# Patient Record
Sex: MALE | Race: WHITE | URBAN - METROPOLITAN AREA
[De-identification: names, ages, dates, MRNs, and addresses within clinical notes are randomized per-mention and may not be internally consistent; named-entity substitution may affect disease eponyms.]

---

## 2017-01-25 DIAGNOSIS — M17.0 PRIMARY OSTEOARTHRITIS OF BOTH KNEES: Primary | ICD-10-CM

## 2017-01-25 RX ORDER — TRAMADOL HYDROCHLORIDE 50 MG/1
50 TABLET ORAL EVERY 6 HOURS PRN
Qty: 10 TABLET | Refills: 0 | Status: SHIPPED | OUTPATIENT
Start: 2017-01-25 | End: 2017-02-17

## 2017-01-25 NOTE — TELEPHONE ENCOUNTER
traMADol (ULTRAM) 50 MG tablet      Last Written Prescription Date:  12-15-16  Last Fill Quantity: 10,   # refills: 0  Last Office Visit with [facility], [facility] or [facility] prescribing provider: 11-9-16  Future Office visit:       Routing refill request to provider for review/approval because:  Drug not on the [facility], [facility] or [facility] refill protocol or controlled substance

## 2017-02-02 DIAGNOSIS — M17.0 PRIMARY OSTEOARTHRITIS OF BOTH KNEES: Primary | ICD-10-CM

## 2017-02-02 NOTE — TELEPHONE ENCOUNTER
traMADol (ULTRAM) 50 MG tablet      Last Written Prescription Date:  1/25/17  Last Fill Quantity: 10,   # refills: 0  Last Office Visit with [facility], [facility] or [facility] prescribing provider: 11/9/16  Future Office visit:       Routing refill request to provider for review/approval because:  Drug not on the [facility], [facility] or [facility] refill protocol or controlled substance

## 2017-02-03 RX ORDER — TRAMADOL HYDROCHLORIDE 50 MG/1
50 TABLET ORAL EVERY 6 HOURS PRN
Qty: 10 TABLET | Refills: 0 | OUTPATIENT
Start: 2017-02-03

## 2017-02-17 DIAGNOSIS — M75.41 IMPINGEMENT SYNDROME OF RIGHT SHOULDER: ICD-10-CM

## 2017-02-17 DIAGNOSIS — M17.0 PRIMARY OSTEOARTHRITIS OF BOTH KNEES: ICD-10-CM

## 2017-02-17 DIAGNOSIS — J31.0 CHRONIC RHINITIS: ICD-10-CM

## 2017-02-17 RX ORDER — FLUTICASONE PROPIONATE 50 MCG
2 SPRAY, SUSPENSION (ML) NASAL DAILY
Qty: 1 BOTTLE | Refills: 5 | Status: SHIPPED | OUTPATIENT
Start: 2017-02-17 | End: 2017-08-19

## 2017-02-17 RX ORDER — NAPROXEN 500 MG/1
500 TABLET ORAL 2 TIMES DAILY PRN
Qty: 60 TABLET | Refills: 2 | Status: SHIPPED | OUTPATIENT
Start: 2017-02-17 | End: 2017-05-18

## 2017-02-17 NOTE — TELEPHONE ENCOUNTER
traMADol (ULTRAM) 50 MG tablet      Last Written Prescription Date:  1-25-17  Last Fill Quantity: 10,   # refills: 0  Last Office Visit with [facility], [facility] or [facility] prescribing provider: 11-9-16  Future Office visit:       Routing refill request to provider for review/approval because:  Drug not on the [facility], [facility] or [facility] refill protocol or controlled substance

## 2017-02-21 RX ORDER — TRAMADOL HYDROCHLORIDE 50 MG/1
50 TABLET ORAL EVERY 6 HOURS PRN
Qty: 10 TABLET | Refills: 0 | Status: SHIPPED | OUTPATIENT
Start: 2017-02-21 | End: 2017-11-21

## 2017-03-17 ENCOUNTER — MYC MEDICAL ADVICE (OUTPATIENT)
Dept: FAMILY MEDICINE | Facility: CLINIC | Age: 67
End: 2017-03-17

## 2017-03-17 DIAGNOSIS — J06.9 UPPER RESPIRATORY TRACT INFECTION, UNSPECIFIED TYPE: Primary | ICD-10-CM

## 2017-03-17 RX ORDER — ALBUTEROL SULFATE 90 UG/1
2 AEROSOL, METERED RESPIRATORY (INHALATION) EVERY 6 HOURS PRN
Qty: 1 INHALER | Refills: 1 | Status: SHIPPED | OUTPATIENT
Start: 2017-03-17 | End: 2017-05-18

## 2017-03-17 NOTE — TELEPHONE ENCOUNTER
Inhaler       Routing refill request to provider for review/approval because:  Medication is reported/historical        Sherri Astorga RN [facility] Triage.

## 2017-03-23 DIAGNOSIS — M17.0 PRIMARY OSTEOARTHRITIS OF BOTH KNEES: ICD-10-CM

## 2017-03-23 NOTE — TELEPHONE ENCOUNTER
traMADol (ULTRAM) 50 MG tablet      Last Written Prescription Date:  2/21/17  Last Fill Quantity: 10,   # refills: 0  Last Office Visit with [facility], [facility] or [facility] prescribing provider: 11/9/16  Future Office visit:       Routing refill request to provider for review/approval because:  Drug not on the [facility], [facility] or [facility] refill protocol or controlled substance

## 2017-03-24 RX ORDER — TRAMADOL HYDROCHLORIDE 50 MG/1
50 TABLET ORAL EVERY 6 HOURS PRN
Qty: 10 TABLET | Refills: 0 | OUTPATIENT
Start: 2017-03-24

## 2017-04-06 DIAGNOSIS — M17.0 PRIMARY OSTEOARTHRITIS OF BOTH KNEES: ICD-10-CM

## 2017-04-06 RX ORDER — TRAMADOL HYDROCHLORIDE 50 MG/1
50 TABLET ORAL EVERY 6 HOURS PRN
Qty: 10 TABLET | Refills: 0 | OUTPATIENT
Start: 2017-04-06

## 2017-04-06 NOTE — TELEPHONE ENCOUNTER
traMADol (ULTRAM) 50 MG tablet      Last Written Prescription Date:  2-21-17  Last Fill Quantity: 10,   # refills: 0  Last Office Visit with [facility], [facility] or [facility] prescribing provider: 11-9-16  Future Office visit:       Routing refill request to provider for review/approval because:  Drug not on the [facility], [facility] or [facility] refill protocol or controlled substance

## 2017-04-14 ENCOUNTER — TELEPHONE (OUTPATIENT)
Dept: FAMILY MEDICINE | Facility: CLINIC | Age: 67
End: 2017-04-14

## 2017-04-14 DIAGNOSIS — M17.0 PRIMARY OSTEOARTHRITIS OF BOTH KNEES: ICD-10-CM

## 2017-04-14 RX ORDER — TRAMADOL HYDROCHLORIDE 50 MG/1
50 TABLET ORAL EVERY 6 HOURS PRN
Qty: 10 TABLET | Refills: 0 | OUTPATIENT
Start: 2017-04-14

## 2017-04-14 NOTE — TELEPHONE ENCOUNTER
Faxed request    traMADol (ULTRAM) 50 MG tablet      Last Written Prescription Date:  2-21-17  Last Fill Quantity: 10,   # refills: 0  Last Office Visit with [facility], [facility] or [facility] prescribing provider: 11-9-16  Future Office visit:       Routing refill request to provider for review/approval because:  Drug not on the [facility], [facility] or [facility] refill protocol or controlled substance

## 2017-04-18 RX ORDER — TRAMADOL HYDROCHLORIDE 50 MG/1
50 TABLET ORAL EVERY 6 HOURS PRN
Qty: 10 TABLET | Refills: 0 | OUTPATIENT
Start: 2017-04-18

## 2017-04-18 NOTE — TELEPHONE ENCOUNTER
Called pharmacy, patient has been requesting this.  Did inform them that we are denying this Rx.

## 2017-04-18 NOTE — TELEPHONE ENCOUNTER
Yes, requested and denied. Same thing again. I don't want him on narcotics for this. You may need to call the pharmacy and mention this so they don't keep requesting the med from us.

## 2017-04-18 NOTE — TELEPHONE ENCOUNTER
Called and spoke with patient, advised of denial.  RN offered to connect him with Ortho again or send message to PCP to see what he advises.  He will go through his Ortho people to see what they would recommend for his pain.    Tracy Laurent RN  [facility]

## 2017-04-18 NOTE — TELEPHONE ENCOUNTER
Looks like this has been requested a couple of times?  (see encounters 3/23 & 4/6).    Routed to PCP.    Tracy Laurent RN  [facility]

## 2017-04-24 DIAGNOSIS — N40.1 BENIGN NON-NODULAR PROSTATIC HYPERPLASIA WITH LOWER URINARY TRACT SYMPTOMS: ICD-10-CM

## 2017-04-24 NOTE — TELEPHONE ENCOUNTER
tamsulosin (FLOMAX) 0.4 MG 24 hr capsule         Last Written Prescription Date: 10-3-16  Last Fill Quantity: 30, # refills: 5    Last Office Visit with [facility], [facility] or [facility] prescribing provider:  11-9-16   Future Office Visit:      BP Readings from Last 3 Encounters:   11/09/16 136/73   11/03/16 138/75   06/15/16 (!) 164/97

## 2017-04-25 RX ORDER — TAMSULOSIN HYDROCHLORIDE 0.4 MG/1
0.4 CAPSULE ORAL DAILY
Qty: 30 CAPSULE | Refills: 5 | Status: SHIPPED | OUTPATIENT
Start: 2017-04-25 | End: 2017-09-09

## 2017-05-18 DIAGNOSIS — M75.41 IMPINGEMENT SYNDROME OF RIGHT SHOULDER: ICD-10-CM

## 2017-05-18 DIAGNOSIS — J06.9 UPPER RESPIRATORY TRACT INFECTION, UNSPECIFIED TYPE: ICD-10-CM

## 2017-05-19 RX ORDER — ALBUTEROL SULFATE 90 UG/1
2 AEROSOL, METERED RESPIRATORY (INHALATION) EVERY 6 HOURS PRN
Qty: 18 G | Refills: 0 | Status: SHIPPED | OUTPATIENT
Start: 2017-05-19 | End: 2017-06-15

## 2017-05-19 RX ORDER — NAPROXEN 500 MG/1
TABLET ORAL
Qty: 60 TABLET | Refills: 1 | Status: SHIPPED | OUTPATIENT
Start: 2017-05-19 | End: 2017-11-21

## 2017-05-19 NOTE — TELEPHONE ENCOUNTER
Routing refill request to provider for review/approval because:  Labs not current:  AST  ALT (2012)  Albuterol needs to be signed.     Tawny Mares RN  [facility]

## 2017-05-19 NOTE — TELEPHONE ENCOUNTER
albuterol (PROAIR HFA/PROVENTIL HFA/VENTOLIN HFA) 108 (90 BASE) MCG/ACT Inhaler       Last Written Prescription Date: 3/17/17  Last Fill Quantity: 1, # refills: 1    Last Office Visit with [facility], [facility] or [facility] prescribing provider:  11/9/16   Future Office Visit:       Date of Last Asthma Action Plan Letter:   There are no preventive care reminders to display for this patient.   Asthma Control Test: No flowsheet data found.    Date of Last Spirometry Test:   No results found for this or any previous visit.        naproxen (NAPROSYN) 500 MG tablet      Last Written Prescription Date: 2/17/17  Last Quantity: 60, # refills: 2  Last Office Visit with [facility], [facility] or [facility] prescribing provider: 11/9/16       Creatinine   Date Value Ref Range Status   11/03/2016 1.03 0.66 - 1.25 mg/dL Final     Lab Results   Component Value Date    AST 41 03/03/2012     Lab Results   Component Value Date    ALT 32 03/03/2012     BP Readings from Last 3 Encounters:   11/09/16 136/73   11/03/16 138/75   06/15/16 (!) 164/97

## 2017-05-25 ENCOUNTER — TELEPHONE (OUTPATIENT)
Dept: FAMILY MEDICINE | Facility: CLINIC | Age: 67
End: 2017-05-25

## 2017-05-25 NOTE — TELEPHONE ENCOUNTER
Forms received. They were for Edwige's significant other. Closing encounter-forms being addressed in correct patient chart.

## 2017-05-25 NOTE — TELEPHONE ENCOUNTER
Reason for Call:  Other call back    Detailed comments:   Patient has a form that he needs DBL to complete today for unemployment/county benefits.  He will be faxing form over to clinic now.  Please fax forms to him when done.  Please address ASAP.    Phone Number Patient can be reached at: Home number on file [phone] (home)    Best Time: any    Can we leave a detailed message on this number? YES    Call taken on 5/25/2017 at 12:43 PM by Susy Zamudio

## 2017-06-08 DIAGNOSIS — M75.41 IMPINGEMENT SYNDROME OF RIGHT SHOULDER: ICD-10-CM

## 2017-06-08 DIAGNOSIS — J06.9 UPPER RESPIRATORY TRACT INFECTION, UNSPECIFIED TYPE: ICD-10-CM

## 2017-06-08 NOTE — TELEPHONE ENCOUNTER
naproxen (NAPROSYN) 500 MG tablet      Last Written Prescription Date: 5/19/17  Last Quantity: 60, # refills: 1  Last Office Visit with [facility], [facility] or [facility] prescribing provider: 11/9/16       Creatinine   Date Value Ref Range Status   11/03/2016 1.03 0.66 - 1.25 mg/dL Final     Lab Results   Component Value Date    AST 41 03/03/2012     Lab Results   Component Value Date    ALT 32 03/03/2012     BP Readings from Last 3 Encounters:   11/09/16 136/73   11/03/16 138/75   06/15/16 (!) 164/97     albuterol (VENTOLIN HFA) 108 (90 BASE) MCG/ACT Inhaler       Last Written Prescription Date: 5/19/17  Last Fill Quantity: 18, # refills: 0    Last Office Visit with [facility], [facility] or [facility] prescribing provider:  11/9/16   Future Office Visit:       Date of Last Asthma Action Plan Letter:   There are no preventive care reminders to display for this patient.   Asthma Control Test: No flowsheet data found.    Date of Last Spirometry Test:   No results found for this or any previous visit.

## 2017-06-09 DIAGNOSIS — M17.0 PRIMARY OSTEOARTHRITIS OF BOTH KNEES: ICD-10-CM

## 2017-06-09 RX ORDER — NAPROXEN 500 MG/1
TABLET ORAL
Qty: 60 TABLET | Refills: 0 | Status: SHIPPED | OUTPATIENT
Start: 2017-06-09 | End: 2017-07-12

## 2017-06-09 RX ORDER — TRAMADOL HYDROCHLORIDE 50 MG/1
50 TABLET ORAL EVERY 6 HOURS PRN
Qty: 10 TABLET | Refills: 0 | OUTPATIENT
Start: 2017-06-09

## 2017-06-09 NOTE — TELEPHONE ENCOUNTER
traMADol (ULTRAM) 50 MG tablet      Last Written Prescription Date:  02/21/2017  Last Fill Quantity: 10,   # refills: 0  [facility] Visit with [facility] or [facility] prescribing provider: 11/09/2016  Future Office visit:       Routing refill request to provider for review/approval because:  Drug not on the [facility], [facility] or [facility] refill protocol or controlled substance

## 2017-06-09 NOTE — TELEPHONE ENCOUNTER
Naproxen   Prescription approved per [facility] Ref[place] Protocol.    Albuterol inhaler was prescribed for URI on 11/9/2016 now requesting a refill? L/M for patient to clarify need for Albuterol inhaler.     Tawny Mares RN  [facility]

## 2017-06-13 NOTE — TELEPHONE ENCOUNTER
Patient will need to be seen before refills of Ventolin inhaler.  Last picked up at pharmacy on 5/23/17   L/M to call nurse line.    Tawny Mares RN  [facility]

## 2017-06-15 DIAGNOSIS — M17.0 PRIMARY OSTEOARTHRITIS OF BOTH KNEES: ICD-10-CM

## 2017-06-15 DIAGNOSIS — J06.9 UPPER RESPIRATORY TRACT INFECTION, UNSPECIFIED TYPE: ICD-10-CM

## 2017-06-15 RX ORDER — ALBUTEROL SULFATE 90 UG/1
2 AEROSOL, METERED RESPIRATORY (INHALATION) EVERY 6 HOURS PRN
Qty: 18 G | Refills: 1 | Status: SHIPPED | OUTPATIENT
Start: 2017-06-15 | End: 2017-09-07

## 2017-06-15 RX ORDER — ALBUTEROL SULFATE 90 UG/1
AEROSOL, METERED RESPIRATORY (INHALATION)
Qty: 18 G | Refills: 0 | OUTPATIENT
Start: 2017-06-15

## 2017-06-15 RX ORDER — TRAMADOL HYDROCHLORIDE 50 MG/1
50 TABLET ORAL EVERY 6 HOURS PRN
Qty: 10 TABLET | Refills: 0 | OUTPATIENT
Start: 2017-06-15

## 2017-06-15 NOTE — TELEPHONE ENCOUNTER
traMADol (ULTRAM) 50 MG tablet      Last Written Prescription Date:  2-21-17  Last Fill Quantity: 10,   # refills: 0  Last [facility] Visit with [facility] or [facility] prescribing provider: 11-9-16  Future Office visit:       Routing refill request to provider for review/approval because:  Drug not on the [facility], [facility] or [facility] refill protocol or controlled substance

## 2017-06-15 NOTE — TELEPHONE ENCOUNTER
albuterol (VENTOLIN HFA) 108 (90 BASE) MCG/ACT Inhaler       Last Written Prescription Date: 5-19-17  Last Fill Quantity: 18g, # refills: 0    Last Office Visit with [facility], [facility] or [facility] prescribing provider:  11-9-16   Future Office Visit:       Date of Last Asthma Action Plan Letter:   There are no preventive care reminders to display for this patient.   Asthma Control Test: No flowsheet data found.    Date of Last Spirometry Test:   No results found for this or any previous visit.

## 2017-06-15 NOTE — TELEPHONE ENCOUNTER
Routing refill request to provider for review/approval because:  Diagnosis is not on problem list  AAP, ACT not completed.    Sherri Astorga RN [facility] Triage.

## 2017-06-21 NOTE — TELEPHONE ENCOUNTER
This has been denied in the past and is denied again. I do not want him on narcotics for his musculoskeletal ailments.

## 2017-06-21 NOTE — TELEPHONE ENCOUNTER
Reason for Call:  Other prescription    Detailed comments: Aisha marley [facility] is calling to follow up on refill request that was sent on the 9th    Phone Number Patient can be reached at: Other phone number:  [phone]    Best Time: asap    Can we leave a detailed message on this number? YES    Call taken on 6/21/2017 at 2:37 PM by Elmo Carolina

## 2017-06-22 NOTE — TELEPHONE ENCOUNTER
TC contacted Aisha at the pharmacy and informed them that refill request is denied. Routing to communicate message to patient.

## 2017-06-22 NOTE — TELEPHONE ENCOUNTER
Patient last seen for routine visit 11/9/16.    I see the previous refusal note:    April 18, 2017   Tracy Laurent, JENNIFER          Called and spoke with patient, advised of denial.  RN offered to connect him with Ortho again or send message to PCP to see what he advises.  He will go through his Ortho people to see what they would recommend for his pain.     Tracy Laurent RN  [facility]           Also, more recently, 6/21/17:    Frandy Page MD       3:12 PM   Note      This has been denied in the past and is denied again. I do not want him on narcotics for his musculoskeletal ailments.              Attempted to call patient at home number, left message on answering service requesting call back to clinic to discuss.  Fern Gallegos RN  [facility]

## 2017-06-26 NOTE — TELEPHONE ENCOUNTER
Attempted to call patient at [phone] (home), no answer.  Left VM to return call to RN Triage line.    Tracy Laurent RN  [facility]

## 2017-06-27 NOTE — TELEPHONE ENCOUNTER
Attempted to call patient at home number, left message on answering service requesting call back to clinic to discuss.  Fern Gallegos RN  [facility]

## 2017-06-27 NOTE — TELEPHONE ENCOUNTER
Huddled with PCP.  If patient does not return call today, will send iTaggithart message communicating the desire to not use narcotics for musculoskeletal pain.  This RN will send message.      Tracy Laurent RN  [facility]

## 2017-06-28 NOTE — TELEPHONE ENCOUNTER
Informed via MyChart of refusal.    Tracy Laurent RN  [facility]

## 2017-07-12 DIAGNOSIS — M75.41 IMPINGEMENT SYNDROME OF RIGHT SHOULDER: ICD-10-CM

## 2017-07-12 RX ORDER — NAPROXEN 500 MG/1
TABLET ORAL
Qty: 60 TABLET | Refills: 0 | Status: SHIPPED | OUTPATIENT
Start: 2017-07-12 | End: 2017-08-15

## 2017-07-12 NOTE — TELEPHONE ENCOUNTER
naproxen (NAPROSYN) 500 MG tablet      Last Written Prescription Date: 6/9/17  Last Quantity: 60, # refills: 0  Last Office Visit with [facility], [facility] or [facility] prescribing provider: 11/9/16       Creatinine   Date Value Ref Range Status   11/03/2016 1.03 0.66 - 1.25 mg/dL Final     Lab Results   Component Value Date    AST 41 03/03/2012     Lab Results   Component Value Date    ALT 32 03/03/2012     BP Readings from Last 3 Encounters:   11/09/16 136/73   11/03/16 138/75   06/15/16 (!) 164/97

## 2017-07-12 NOTE — TELEPHONE ENCOUNTER
Routing refill request to provider for review/approval because:  Labs not current:  Liver function.     Arline Cyr RN

## 2017-08-15 DIAGNOSIS — M75.41 IMPINGEMENT SYNDROME OF RIGHT SHOULDER: ICD-10-CM

## 2017-08-16 RX ORDER — NAPROXEN 500 MG/1
TABLET ORAL
Qty: 60 TABLET | Refills: 1 | Status: SHIPPED | OUTPATIENT
Start: 2017-08-16 | End: 2017-09-09

## 2017-08-16 NOTE — TELEPHONE ENCOUNTER
naproxen (NAPROSYN) 500 MG tablet      Last Written Prescription Date: 7-12-17  Last Quantity: 60, # refills: 0  Last Office Visit with [facility], [facility] or [facility] prescribing provider: 11-9-16       Creatinine   Date Value Ref Range Status   11/03/2016 1.03 0.66 - 1.25 mg/dL Final     Lab Results   Component Value Date    AST 41 03/03/2012     Lab Results   Component Value Date    ALT 32 03/03/2012     BP Readings from Last 3 Encounters:   11/09/16 136/73   11/03/16 138/75   06/15/16 (!) 164/97

## 2017-08-16 NOTE — TELEPHONE ENCOUNTER
Routing refill request to provider for review/approval because:  Labs not current  Sherri Astorga RN [facility] Triage.

## 2017-08-19 DIAGNOSIS — J31.0 CHRONIC RHINITIS: ICD-10-CM

## 2017-08-21 RX ORDER — FLUTICASONE PROPIONATE 50 MCG
SPRAY, SUSPENSION (ML) NASAL
Qty: 16 ML | Refills: 2 | Status: SHIPPED | OUTPATIENT
Start: 2017-08-21 | End: 2017-12-18

## 2017-08-21 NOTE — TELEPHONE ENCOUNTER
fluticasone (FLONASE) 50 MCG/ACT spray       Last Written Prescription Date: 2-17-17  Last Fill Quantity: 1, # refills: 5    Last Office Visit with [facility], [facility] or [facility] prescribing provider:  11-9-16   Future Office Visit:       Date of Last Asthma Action Plan Letter:   There are no preventive care reminders to display for this patient.   Asthma Control Test: No flowsheet data found.    Date of Last Spirometry Test:   No results found for this or any previous visit.

## 2017-08-21 NOTE — TELEPHONE ENCOUNTER
Prescription approved per [facility] Refill Protocol.  Fern Gallegos RN  [facility]

## 2017-09-07 DIAGNOSIS — J06.9 UPPER RESPIRATORY TRACT INFECTION, UNSPECIFIED TYPE: ICD-10-CM

## 2017-09-08 RX ORDER — ALBUTEROL SULFATE 90 UG/1
AEROSOL, METERED RESPIRATORY (INHALATION)
Qty: 18 G | Refills: 0 | Status: SHIPPED | OUTPATIENT
Start: 2017-09-08 | End: 2017-09-27

## 2017-09-08 NOTE — TELEPHONE ENCOUNTER
albuterol (VENTOLIN HFA) 108 (90 BASE) MCG/ACT Inhaler       Last Written Prescription Date: 6/15/17  Last Fill Quantity: 18, # refills: 1    Last Office Visit with [facility], [facility] or [facility] prescribing provider:  11/9/16   Future Office Visit:       Date of Last Asthma Action Plan Letter:   There are no preventive care reminders to display for this patient.   Asthma Control Test: No flowsheet data found.    Date of Last Spirometry Test:   No results found for this or any previous visit.

## 2017-09-08 NOTE — TELEPHONE ENCOUNTER
Routing refill request to provider for review/approval because:  Diagnosis is not on problem list  AAP, ACT not completed.  Anais Rich RN-BSN

## 2017-09-09 DIAGNOSIS — M75.41 IMPINGEMENT SYNDROME OF RIGHT SHOULDER: ICD-10-CM

## 2017-09-09 DIAGNOSIS — N40.1 BENIGN NON-NODULAR PROSTATIC HYPERPLASIA WITH LOWER URINARY TRACT SYMPTOMS: ICD-10-CM

## 2017-09-11 NOTE — TELEPHONE ENCOUNTER
naproxen (NAPROSYN) 500 MG tablet      Last Written Prescription Date: 8/16/17  Last Quantity: 60, # refills: 1  Last Office Visit with [facility], [facility], [facility] or [place] prescribing provider: 8/16/17       Creatinine   Date Value Ref Range Status   11/03/2016 1.03 0.66 - 1.25 mg/dL Final     Lab Results   Component Value Date    AST 41 03/03/2012     Lab Results   Component Value Date    ALT 32 03/03/2012     BP Readings from Last 3 Encounters:   11/09/16 136/73   11/03/16 138/75   06/15/16 (!) 164/97     tamsulosin (FLOMAX) 0.4 MG capsule         Last Written Prescription Date: 4/25/17  Last Fill Quantity: 30, # refills: 5    Last Office Visit with [facility], [facility], [facility] or [place] prescribing provider:  11/9/16   Future Office Visit:      BP Readings from Last 3 Encounters:   11/09/16 136/73   11/03/16 138/75   06/15/16 (!) 164/97

## 2017-09-12 RX ORDER — NAPROXEN 500 MG/1
TABLET ORAL
Qty: 60 TABLET | Refills: 1 | Status: SHIPPED | OUTPATIENT
Start: 2017-09-12 | End: 2017-11-21

## 2017-09-12 RX ORDER — TAMSULOSIN HYDROCHLORIDE 0.4 MG/1
CAPSULE ORAL
Qty: 30 CAPSULE | Refills: 1 | Status: SHIPPED | OUTPATIENT
Start: 2017-09-12 | End: 2017-12-21

## 2017-09-27 DIAGNOSIS — J06.9 UPPER RESPIRATORY TRACT INFECTION, UNSPECIFIED TYPE: ICD-10-CM

## 2017-09-27 NOTE — TELEPHONE ENCOUNTER
VENTOLIN  (90 BASE) MCG/ACT Inhaler       Last Written Prescription Date: 9/8/17  Last Fill Quantity: 18, # refills: 0    Last Office Visit with [facility], [facility] or [facility] prescribing provider:  11/9/16   Future Office Visit:       Date of Last Asthma Action Plan Letter:   There are no preventive care reminders to display for this patient.   Asthma Control Test: No flowsheet data found.    Date of Last Spirometry Test:   No results found for this or any previous visit.

## 2017-09-29 RX ORDER — ALBUTEROL SULFATE 90 UG/1
AEROSOL, METERED RESPIRATORY (INHALATION)
Qty: 18 G | Refills: 2 | Status: ON HOLD | OUTPATIENT
Start: 2017-09-29 | End: 2018-01-04

## 2017-09-29 NOTE — TELEPHONE ENCOUNTER
Prescription approved per [facility] Refill Protocol.      Tracy Laurent RN  [facility]

## 2017-10-05 ENCOUNTER — TRANSFERRED RECORDS (OUTPATIENT)
Dept: HEALTH INFORMATION MANAGEMENT | Facility: CLINIC | Age: 67
End: 2017-10-05

## 2017-10-17 ENCOUNTER — TELEPHONE (OUTPATIENT)
Dept: FAMILY MEDICINE | Facility: CLINIC | Age: 67
End: 2017-10-17

## 2017-10-17 NOTE — LETTER
My Depression Action Plan  Name: Edwige Ochoa   Date of Birth 1950  Date: 10/24/2017    My doctor: Frandy Page   My clinic: [address] [id number] [phone]          GREEN    ZONE   Good Control    What it looks like:     Things are going generally well. You have normal up s and down s. You may even feel depressed from time to time, but bad moods usually last less than a day.   What you need to do:  1. Continue to care for yourself (see self care plan)  2. Check your depression survival kit and update it as needed  3. Follow your physician s recommendations including any medication.  4. Do not stop taking medication unless you consult with your physician first.           YELLOW         ZONE Getting Worse    What it looks like:     Depression is starting to interfere with your life.     It may be hard to get out of bed; you may be starting to isolate yourself from others.    Symptoms of depression are starting to last most all day and this has happened for several days.     You may have suicidal thoughts but they are not constant.   What you need to do:     1. Call your care team, your response to treatment will improve if you keep your care team informed of your progress. Yellow periods are signs an adjustment may need to be made.     2. Continue your self-care, even if you have to fake it!    3. Talk to someone in your support network    4. Open up your depression survival kit           RED    ZONE Medical Alert - Get Help    What it looks like:     Depression is seriously interfering with your life.     You may experience these or other symptoms: You can t get out of bed most days, can t work or engage in other necessary activities, you have trouble taking care of basic hygiene, or basic responsibilities, thoughts of suicide or death that will not go away, self-injurious behavior.     What you need to  do:  1. Call your care team and request a same-day appointment. If they are not available (weekends or after hours) call your local crisis line, emergency room or 911.      Electronically signed by: Frandy Page, October 24, 2017    Depression Self Care Plan / Survival Kit    Self-Care for Depression  Here s the deal. Your body and mind are really not as separate as most people think.  What you do and think affects how you feel and how you feel influences what you do and think. This means if you do things that people who feel good do, it will help you feel better.  Sometimes this is all it takes.  There is also a place for medication and therapy depending on how severe your depression is, so be sure to consult with your medical provider and/ or Behavioral Health Consultant if your symptoms are worsening or not improving.     In order to better manage my stress, I will:    Exercise  Get some form of exercise, every day. This will help reduce pain and release endorphins, the  feel good  chemicals in your brain. This is almost as good as taking antidepressants!  This is not the same as joining a gym and then never going! (they count on that by the way ) It can be as simple as just going for a walk or doing some gardening, anything that will get you moving.      Hygiene   Maintain good hygiene (Get out of bed in the morning, Make your bed, Brush your teeth, Take a shower, and Get dressed like you were going to work, even if you are unemployed).  If your clothes don't fit try to get ones that do.    Diet  I will strive to eat foods that are good for me, drink plenty of water, and avoid excessive sugar, caffeine, alcohol, and other mood-altering substances.  Some foods that are helpful in depression are: complex carbohydrates, B vitamins, flaxseed, fish or fish oil, fresh fruits and vegetables.    Psychotherapy  I agree to participate in Individual Therapy (if recommended).    Medication  If prescribed medications, I  agree to take them.  Missing doses can result in serious side effects.  I understand that drinking alcohol, or other illicit drug use, may cause potential side effects.  I will not stop my medication abruptly without first discussing it with my provider.    Staying Connected With Others  I will stay in touch with my friends, family members, and my primary care provider/team.    Use your imagination  Be creative.  We all have a creative side; it doesn t matter if it s oil painting, sand castles, or mud pies! This will also kick up the endorphins.    Witness Beauty  (AKA stop and smell the roses) Take a look outside, even in mid-winter. Notice colors, textures. Watch the squirrels and birds.     Service to others  Be of service to others.  There is always someone else in need.  By helping others we can  get out of ourselves  and remember the really important things.  This also provides opportunities for practicing all the other parts of the program.    Humor  Laugh and be silly!  Adjust your TV habits for less news and crime-drama and more comedy.    Control your stress  Try breathing deep, massage therapy, biofeedback, and meditation. Find time to relax each day.     My support system    Clinic Contact:  Phone number:    Contact 1:  Phone number:    Contact 2:  Phone number:    [employer]/:  Phone number:    Therapist:  Phone number:    Local crisis center:    Phone number:    Other community support:  Phone number:

## 2017-10-17 NOTE — LETTER
October 17, 2017    Edwige Ochoa  [address] [facility] [address]    Dear Edwige    We care about your health and have reviewed your health plan. We have reviewed your medical conditions, medication list, and lab results and are making recommendations based on this review, to better manage your health.    You are in particular need of attention regarding:  - Your Depression  - Scheduling a Colon Cancer Screening (Colonoscopy only) [phone]      Here is a list of Health Maintenance topics that are due now or due soon:  Health Maintenance Due   Topic Date Due     AORTIC ANEURYSM SCREENING (SYSTEM ASSIGNED)  09/20/2015     PHQ-9 Q6 MONTHS  05/03/2017     INFLUENZA VACCINE (SYSTEM ASSIGNED)  09/01/2017     COLON CANCER SCREEN (SYSTEM ASSIGNED)  09/24/2017     DEPRESSION ACTION PLAN Q1 YR  11/03/2017     FALL RISK ASSESSMENT  11/09/2017     We will be calling you in the next couple of weeks to help you schedule any appointments that are needed.  Please call us at [phone] (or use [employer]) to address the above recommendations.     Thank you for trusting [facility] and we appreciate the opportunity to serve you.  We look forward to supporting your healthcare needs in the future.    Healthy Regards,    Dr. Page/rosa

## 2017-10-17 NOTE — LETTER
Enclosed with this letter is a questionnaire about depression for your upcoming visit or contact, and your care team may not see this information before then. We care about you and want to make sure you get the best care possible. If at any time you feel unsafe or have concerns about the safety of others please take immediate action by calling [phone], for mental health crisis phone support 24 hours a day, 365 days per year. As always, you can also go to your local ER, or call 911 if you have immediate safety concerns.

## 2017-10-17 NOTE — TELEPHONE ENCOUNTER
Panel Management Review      Patient has the following on his problem list:     Depression / Dysthymia review    Measure:  Needs PHQ-9 score of 4 or less during index window.  Administer PHQ-9 and if score is 5 or more, send encounter to provider for next steps.    5 - 7 month window range:     PHQ-9 SCORE 7/15/2015 6/2/2016 11/3/2016   Total Score 7 - -   Total Score - 4 3       If PHQ-9 recheck is 5 or more, route to provider for next steps.    Patient is due for:  PHQ9 and DAP        Composite cancer screening  Chart review shows that this patient is due/due soon for the following Colonoscopy  Summary:    Patient is due/failing the following:   COLONOSCOPY, DAP and PHQ9    Action needed:   Patient needs to do PHQ9. and Due for a colonoscopy and DAP     Type of outreach:    Sent letter. and phq-9 with SASE    Questions for provider review:    None                                                                                                                                    Rowan Fung ma       Chart routed to Care Team .

## 2017-10-24 NOTE — TELEPHONE ENCOUNTER
Spoke with patient and he is going to think about a colonoscopy, but will send back the phq-9 ,Please complete a DAP and route chart back to me.

## 2017-11-20 DIAGNOSIS — M75.41 IMPINGEMENT SYNDROME OF RIGHT SHOULDER: ICD-10-CM

## 2017-11-21 RX ORDER — NAPROXEN 500 MG/1
TABLET ORAL
Qty: 60 TABLET | Refills: 0 | Status: SHIPPED | OUTPATIENT
Start: 2017-11-21 | End: 2017-12-18

## 2017-11-21 NOTE — TELEPHONE ENCOUNTER
Requested Prescriptions   Pending Prescriptions Disp Refills     naproxen (NAPROSYN) 500 MG tablet [Pharmacy Med Name: NAPROXEN 500MG TABLETS] 60 tablet 0     Sig: TAKE ONE TABLET BY MOUTH TWICE DAILY AS NEEDED FOR MODERATE PAIN    NSAID Medications Failed    11/20/2017 11:26 PM       Failed - Blood pressure under 140/90    BP Readings from Last 3 Encounters:   11/09/16 136/73   11/03/16 138/75   06/15/16 (!) 164/97                Failed - Normal ALT on file in past 12 months    Recent Labs   Lab Test  03/03/12   1305   ALT  32            Failed - Normal AST on file in past 12 months    Recent Labs   Lab Test  03/03/12   1305   AST  41            Failed - Recent or future visit with authorizing provider's specialty    Patient had office visit in the last year or has a visit in the next 30 days with authorizing provider.  See chart review.              Failed - Patient is age 6-64 years       Failed - Normal CBC on file in past 12 months    Recent Labs   Lab Test  11/03/16   1416   WBC  4.8   RBC  4.24*   HGB  14.1   HCT  39.0*   PLT  262            Failed - Normal serum creatinine on file in past 12 months    Recent Labs   Lab Test  11/03/16   1416   CR  1.03             Routing refill request to provider for review/approval because:  Labs out of range:  Kale Mares RN  [facility]

## 2017-11-25 DIAGNOSIS — N40.1 BENIGN NON-NODULAR PROSTATIC HYPERPLASIA WITH LOWER URINARY TRACT SYMPTOMS: ICD-10-CM

## 2017-11-27 RX ORDER — TAMSULOSIN HYDROCHLORIDE 0.4 MG/1
CAPSULE ORAL
Qty: 30 CAPSULE | Refills: 0 | Status: SHIPPED | OUTPATIENT
Start: 2017-11-27 | End: 2017-12-25

## 2017-11-27 NOTE — TELEPHONE ENCOUNTER
Requested Prescriptions   Pending Prescriptions Disp Refills     tamsulosin (FLOMAX) 0.4 MG capsule [Pharmacy Med Name: TAMSULOSIN 0.4MG CAPSULES] 30 capsule 0     Sig: TAKE 1 CAPSULE(0.4 MG) BY MOUTH DAILY    Alpha Blockers Failed    11/25/2017  9:19 AM       Failed - BP is less than 140/90    BP Readings from Last 3 Encounters:   11/09/16 136/73   11/03/16 138/75   06/15/16 (!) 164/97                Failed - Recent or future visit with authorizing provider's specialty    Patient had office visit in the last year or has a visit in the next 30 days with authorizing provider.  See chart review.              Failed - Patient does not have Tadalafil, Vardenafil, or Sildenafil on their medication list       Passed - Patient is 18 years of age or older      Routing refill request to provider for review/approval because:  Patient needs to be seen because it has been more than 1 year since last office visit. Please advise. Anais Rich RN-BSN

## 2017-11-29 ENCOUNTER — TRANSFERRED RECORDS (OUTPATIENT)
Dept: HEALTH INFORMATION MANAGEMENT | Facility: CLINIC | Age: 67
End: 2017-11-29

## 2017-12-08 ENCOUNTER — TRANSFERRED RECORDS (OUTPATIENT)
Dept: HEALTH INFORMATION MANAGEMENT | Facility: CLINIC | Age: 67
End: 2017-12-08

## 2017-12-15 ENCOUNTER — TRANSFERRED RECORDS (OUTPATIENT)
Dept: HEALTH INFORMATION MANAGEMENT | Facility: CLINIC | Age: 67
End: 2017-12-15

## 2017-12-16 ENCOUNTER — TRANSFERRED RECORDS (OUTPATIENT)
Dept: HEALTH INFORMATION MANAGEMENT | Facility: CLINIC | Age: 67
End: 2017-12-16

## 2017-12-16 LAB
CREAT SERPL-MCNC: 0.87 MG/DL (ref 0.73–1.18)
GFR SERPL CREATININE-BSD FRML MDRD: >60 ML/MIN/1.73M2
GLUCOSE SERPL-MCNC: 110 MG/DL (ref 70–100)
POTASSIUM SERPL-SCNC: 4.5 MMOL/L (ref 3.5–5.2)
TSH SERPL-ACNC: 2 UIU/ML (ref 0.3–4.5)

## 2017-12-18 DIAGNOSIS — J31.0 CHRONIC RHINITIS, UNSPECIFIED TYPE: Primary | ICD-10-CM

## 2017-12-18 DIAGNOSIS — M75.41 IMPINGEMENT SYNDROME OF RIGHT SHOULDER: ICD-10-CM

## 2017-12-21 ENCOUNTER — OFFICE VISIT (OUTPATIENT)
Dept: FAMILY MEDICINE | Facility: CLINIC | Age: 67
End: 2017-12-21
Payer: MEDICARE

## 2017-12-21 VITALS
TEMPERATURE: 98.9 F | SYSTOLIC BLOOD PRESSURE: 160 MMHG | OXYGEN SATURATION: 95 % | BODY MASS INDEX: 29.97 KG/M2 | WEIGHT: 200 LBS | HEART RATE: 78 BPM | DIASTOLIC BLOOD PRESSURE: 80 MMHG

## 2017-12-21 DIAGNOSIS — I10 ESSENTIAL HYPERTENSION WITH GOAL BLOOD PRESSURE LESS THAN 140/90: ICD-10-CM

## 2017-12-21 DIAGNOSIS — F41.1 GAD (GENERALIZED ANXIETY DISORDER): ICD-10-CM

## 2017-12-21 DIAGNOSIS — F32.0 MILD MAJOR DEPRESSION (H): ICD-10-CM

## 2017-12-21 DIAGNOSIS — J06.9 UPPER RESPIRATORY TRACT INFECTION, UNSPECIFIED TYPE: Primary | ICD-10-CM

## 2017-12-21 PROCEDURE — 99214 OFFICE O/P EST MOD 30 MIN: CPT | Performed by: FAMILY MEDICINE

## 2017-12-21 ASSESSMENT — ANXIETY QUESTIONNAIRES
3. WORRYING TOO MUCH ABOUT DIFFERENT THINGS: NOT AT ALL
1. FEELING NERVOUS, ANXIOUS, OR ON EDGE: SEVERAL DAYS
7. FEELING AFRAID AS IF SOMETHING AWFUL MIGHT HAPPEN: NOT AT ALL
2. NOT BEING ABLE TO STOP OR CONTROL WORRYING: NOT AT ALL
6. BECOMING EASILY ANNOYED OR IRRITABLE: NOT AT ALL
GAD7 TOTAL SCORE: 2
5. BEING SO RESTLESS THAT IT IS HARD TO SIT STILL: NOT AT ALL

## 2017-12-21 ASSESSMENT — PATIENT HEALTH QUESTIONNAIRE - PHQ9
SUM OF ALL RESPONSES TO PHQ QUESTIONS 1-9: 8
5. POOR APPETITE OR OVEREATING: SEVERAL DAYS

## 2017-12-21 NOTE — LETTER
[address] [address]  Phone: [phone]  Fax: [phone]    December 21, 2017        Edwige Ochoa  [address] [address] [facility] [address]          To whom it may concern:    RE: Edwige Gibbons was seen today in follow-up for a respiratory and sinus infection.  He has missed work since last week because of this, but has now recovered to where he may return to work without restrictions.    Thank you for your consideration.        Sincerely,        Frandy Page MD

## 2017-12-21 NOTE — NURSING NOTE
"Chief Complaint   Patient presents with     Fatigue     No energy/not able to work     Fall     Falling at night     Health Maintenance     Phq9, GAD7, Colonoscopy, Flu shot declined, Prevnar 13       Initial /88 (BP Location: Right arm, Patient Position: Chair, Cuff Size: Adult Regular)  Pulse 78  Temp 98.9  F (37.2  C) (Oral)  Wt 200 lb (90.7 kg)  SpO2 95%  BMI 29.97 kg/m2 Estimated body mass index is 29.97 kg/(m^2) as calculated from the following:    Height as of 11/9/16: 5' 8.5\" (1.74 m).    Weight as of this encounter: 200 lb (90.7 kg).  Medication Reconciliation: complete   Phq9 and GAD7 was given to the patient to be completed.    Freda Jung CMA       "

## 2017-12-21 NOTE — PROGRESS NOTES
SUBJECTIVE:   Edwige Ochoa is a 67 year old male who presents to clinic today for the following health issues:      Concern - Fatigue, falling at night. Cold started Thanksgiving Day  Onset:     Description:   Fatigue after second day of azithromycin, States that he has been falling at night, sleep walking.    Intensity: mild    Progression of Symptoms:  improving    Accompanying Signs & Symptoms:  URI infection    Previous history of similar problem:       Precipitating factors:   Worsened by:     Alleviating factors:  Improved by:     Therapies Tried and outcome: Was put on 2 different antibiotics from [facility].    He started feeling poorly on Thanksgiving day.  He was seen in urgent care a couple of days later.  He was having sinus symptoms.  He was treated with a 10 day course of Augmentin.  He did not feel much better.  He went back into the [facility] urgent care setting done and was prescribed azithromycin.  He started feeling better after a few days.  He took it for the full 5 days.  During the course of treatment, he started feeling rundown and fatigued.  He did not feel up to working.  He currently works at [employer] in [place].  He thinks he worked Monday and Tuesday of last week, but has been off since then.  He was given a note to return to regular work duties on Monday of this week, but did not feel up to that.  He had been seen at another urgent care on December 16 and had numerous labs done.  He brings in those lab results for review.  He does acknowledge feeling better, but is still somewhat tired.    Problem list and histories reviewed & adjusted, as indicated.  Additional history: as documented    Patient Active Problem List   Diagnosis     Allergic RHINITIS     Erectile dysfunction     Health Care Home     Advanced directives, counseling/discussion     Cervicalgia     H/O: substance abuse     Elevated prostate specific antigen (PSA)     Hyperlipidemia with target LDL less  than 130     Pain in joint, shoulder region     Rotator cuff tear     Gastroesophageal reflux disease without esophagitis     Benign non-nodular prostatic hyperplasia with lower urinary tract symptoms     Primary osteoarthritis of both knees     Primary insomnia     Complete tear of left rotator cuff     Tendonitis of both rotator cuffs     Essential hypertension with goal blood pressure less than 140/90     Obesity (BMI 30.0-34.9)     Impaired fasting glucose     Past Surgical History:   Procedure Laterality Date     COLONOSCOPY       right hand/wrist surgery      due to injury     SINUS SURGERY  1995     TONSILLECTOMY         Social History   Substance Use Topics     Smoking status: Never Smoker     Smokeless tobacco: Never Used     Alcohol use No      Comment: last drink 3 wks ago     Family History   Problem Relation Age of Onset     HEART DISEASE Father      Hypertension Father      CEREBROVASCULAR DISEASE Mother      MENTAL ILLNESS Mother      HEART DISEASE Maternal Grandfather      MENTAL ILLNESS Daughter      Substance Abuse Daughter          Current Outpatient Prescriptions   Medication Sig Dispense Refill     tamsulosin (FLOMAX) 0.4 MG capsule TAKE 1 CAPSULE(0.4 MG) BY MOUTH DAILY 30 capsule 0     naproxen (NAPROSYN) 500 MG tablet TAKE ONE TABLET BY MOUTH TWICE DAILY AS NEEDED FOR MODERATE PAIN 60 tablet 0     LANsoprazole (PREVACID) 30 MG capsule Take 1 capsule (30 mg) by mouth daily Take 30-60 minutes before a meal. 30 capsule 5     sildenafil (VIAGRA) 100 MG tablet Take 0.5-1 tablets ( mg) by mouth daily as needed for erectile dysfunction Take 30 min to 4 hours before intercourse.  Never use with nitroglycerin, terazosin or doxazosin. 12 tablet 5     traZODone (DESYREL) 150 MG tablet Take 3 tablets (450 mg) by mouth At Bedtime (Patient taking differently: Take 300 mg by mouth At Bedtime ) 90 tablet 2     Cholecalciferol (VITAMIN D3) 400 UNITS CAPS Take 1 capsule by mouth 2 times daily        ascorbic acid (VITAMIN C) 1000 MG TABS Take 1,000 mg by mouth 2 times daily       vitamin E (TOCOPHEROL) 1000 UNIT capsule Take 1,000 Units by mouth daily       Echinacea CAPS Take 1,520 mg by mouth 2 times daily       GLUCOSAMINE-CHONDROITIN PO Take 2 tablets by mouth 2 times daily 1500/1200mg       Omega 3-6-9 Fatty Acids CAPS Take 1 capsule by mouth 2 times daily       B Complex TABS Take 1 tablet by mouth daily        sertraline (ZOLOFT) 100 MG tablet Take 1.5 tablets by mouth daily       acetaminophen (TYLENOL) 500 MG tablet Take 1,000 mg by mouth as needed        ibuprofen 200 MG capsule Take 600 mg by mouth as needed        mirtazapine (REMERON) 30 MG tablet Take 1 tablet by mouth At Bedtime. 30 tablet 0     VENTOLIN  (90 BASE) MCG/ACT Inhaler INHALE 2 PUFFS INTO THE LUNGS EVERY 6 HOURS AS NEEDED FOR SHORTNESS OF BREATH OR DIFFICULT BREATHING OR WHEEZING (Patient not taking: Reported on 12/21/2017) 18 g 2     fluticasone (FLONASE) 50 MCG/ACT spray SHAKE LIQUID AND USE 2 SPRAYS IN EACH NOSTRIL DAILY 16 mL 2     No Known Allergies      Reviewed and updated as needed this visit by clinical staffTobacco  Allergies  Meds  Med Hx  Surg Hx  Fam Hx  Soc Hx      Reviewed and updated as needed this visit by Provider         ROS:  His URI symptoms have significantly improved.  He is not running any fever.  He normally feels like his anxiety and depression have been fairly well controlled, although he has been feeling poorly physically of late and he thinks that is affecting him mentally.    OBJECTIVE:     /80 (BP Location: Left arm, Patient Position: Chair, Cuff Size: Adult Regular)  Pulse 78  Temp 98.9  F (37.2  C) (Oral)  Wt 200 lb (90.7 kg)  SpO2 95%  BMI 29.97 kg/m2  Body mass index is 29.97 kg/(m^2).  GENERAL: healthy, alert and no distress  HENT: Grossly normal  NECK: no adenopathy, no asymmetry, masses, or scars and thyroid normal to palpation  RESP: lungs clear to auscultation - no rales,  rhonchi or wheezes  PSYCH: affect is pleasant and appropriate.  He scored an 8 on his PHQ 9 and a 2 on the JENNY 7.  He feels like the PHQ 9 score is high only because he has been sick lately, though --otherwise it would be lower.        Diagnostic Test Results:  Outside lab results were reviewed from the 16th and show normal TSH, negative mono test, normal CBC with differential, and essentially normal basic metabolic panel.    ASSESSMENT/PLAN:       ICD-10-CM    1. Upper respiratory tract infection, unspecified type J06.9    2. Essential hypertension with goal blood pressure less than 140/90 I10    3. JENNY (generalized anxiety disorder) F41.1    4. Mild major depression (H) F32.0      He has had 2 courses of antibiotics and  it appears he needs no further medicine to treat any infection  His exam checks out well and his vital signs are fine, except for the elevated blood pressure  He has had some elevated blood pressure readings in the past as well  He is reluctant to go on medication for that for now  We discussed diet and exercise treatment for that  He will try that for a month or so and then return for a general physical and fasting lab work  He will continue his same mental health meds in the meantime as well    He was given a note to return to work    Frandy Page MD  [facility]

## 2017-12-21 NOTE — MR AVS SNAPSHOT
After Visit Summary   12/21/2017    Edwige Ochoa    MRN: [id number]           Patient Information     Date Of Birth          1950        Visit Information        Provider Department      12/21/2017 3:00 PM Frandy Page MD [facility]        Today's Diagnoses     Upper respiratory tract infection, unspecified type    -  1    Essential hypertension with goal blood pressure less than 140/90        JENNY (generalized anxiety disorder)        Mild major depression (H)           Follow-ups after your visit        Follow-up notes from your care team     Return if symptoms worsen or fail to improve.      Who to contact     If you have questions or need follow up information about today's clinic visit or your schedule please contact [facility] directly at [phone].  Normal or non-critical lab and imaging results will be communicated to you by [employer], letter or phone within 4 business days after the clinic has received the results. If you do not hear from us within 7 days, please contact the clinic through [employer] or phone. If you have a critical or abnormal lab result, we will notify you by phone as soon as possible.  Submit refill requests through [employer] or call your pharmacy and they will forward the refill request to us. Please allow 3 business days for your refill to be completed.          Additional Information About Your Visit        MyChart Information     [employer] gives you secure access to your electronic health record. If you see a primary care provider, you can also send messages to your care team and make appointments. If you have questions, please call your primary care clinic.  If you do not have a primary care provider, please call [phone] and they will assist you.        Care EveryWhere ID     This is your Care EveryWhere ID. This could be used by other organizations to access your [place] medical records  [id number]         Your Vitals Were     Pulse Temperature Pulse Oximetry BMI (Body Mass Index)          78 98.9  F (37.2  C) (Oral) 95% 29.97 kg/m2         Blood Pressure from Last 3 Encounters:   12/21/17 165/88   11/09/16 136/73   11/03/16 138/75    Weight from Last 3 Encounters:   12/21/17 200 lb (90.7 kg)   11/09/16 212 lb (96.2 kg)   11/03/16 218 lb 12 oz (99.2 kg)              Today, you had the following     No orders found for display         Today's Medication Changes          These changes are accurate as of: 12/21/17  3:43 PM.  If you have any questions, ask your nurse or doctor.               These medicines have changed or have updated prescriptions.        Dose/Directions    traZODone 150 MG tablet   Commonly known as:  DESYREL   This may have changed:  how much to take   Used for:  Insomnia        Dose:  450 mg   Take 3 tablets (450 mg) by mouth At Bedtime   Quantity:  90 tablet   Refills:  2                Primary Care Provider Office Phone # Fax #    Frandy Page [phone]       [address] [facility] [address]        Equal Access to Services     [facility] HospitalPRINCESS AH: Hadii lars dash hadlanceo Sobonny, waaxda luqadaha, qaybta kaalmada adeegyada, maria d esparza. So [facility] [phone].    ATENCIÓN: Si habla español, tiene a carnes disposición servicios gratuitos de asistencia lingüística. Llame al [phone].    We comply with applicable federal civil rights laws and [place] laws. We do not discriminate on the basis of race, color, national origin, age, disability, sex, sexual orientation, or gender identity.            Thank you!     Thank you for choosing [facility]  for your care. Our goal is always to provide you with excellent care. Hearing back from our patients is one way we can continue to improve our services. Please take a few minutes to complete the written survey that you may receive in the mail after your visit with us. Thank you!              Your Updated Medication List - Protect others around you: Learn how to safely use, store and throw away your medicines at www.disposemymeds.org.          This list is accurate as of: 12/21/17  3:43 PM.  Always use your most recent med list.                   Brand Name Dispense Instructions for use Diagnosis    acetaminophen 500 MG tablet    TYLENOL     Take 1,000 mg by mouth as needed        ascorbic acid 1000 MG Tabs    vitamin C     Take 1,000 mg by mouth 2 times daily        B Complex Tabs      Take 1 tablet by mouth daily        Echinacea Caps      Take 1,520 mg by mouth 2 times daily        fluticasone 50 MCG/ACT spray    FLONASE    16 mL    SHAKE LIQUID AND USE 2 SPRAYS IN EACH NOSTRIL DAILY    Chronic rhinitis       GLUCOSAMINE-CHONDROITIN PO      Take 2 tablets by mouth 2 times daily 1500/1200mg        ibuprofen 200 MG capsule      Take 600 mg by mouth as needed        LANsoprazole 30 MG CR capsule    PREVACID    30 capsule    Take 1 capsule (30 mg) by mouth daily Take 30-60 minutes before a meal.    Gastroesophageal reflux disease without esophagitis       mirtazapine 30 MG tablet    REMERON    30 tablet    Take 1 tablet by mouth At Bedtime.    Depression       naproxen 500 MG tablet    NAPROSYN    60 tablet    TAKE ONE TABLET BY MOUTH TWICE DAILY AS NEEDED FOR MODERATE PAIN    Impingement syndrome of right shoulder       Omega 3-6-9 Fatty Acids Caps      Take 1 capsule by mouth 2 times daily        tamsulosin 0.4 MG capsule    FLOMAX    30 capsule    TAKE 1 CAPSULE(0.4 MG) BY MOUTH DAILY    Benign non-nodular prostatic hyperplasia with lower urinary tract symptoms       traZODone 150 MG tablet    DESYREL    90 tablet    Take 3 tablets (450 mg) by mouth At Bedtime    Insomnia       VENTOLIN  (90 BASE) MCG/ACT Inhaler   Generic drug:  albuterol     18 g    INHALE 2 PUFFS INTO THE LUNGS EVERY 6 HOURS AS NEEDED FOR SHORTNESS OF BREATH OR DIFFICULT BREATHING OR WHEEZING    Upper respiratory  tract infection, unspecified type       VIAGRA 100 MG tablet   Generic drug:  sildenafil     12 tablet    Take 0.5-1 tablets ( mg) by mouth daily as needed for erectile dysfunction Take 30 min to 4 hours before intercourse.  Never use with nitroglycerin, terazosin or doxazosin.    Vasculogenic erectile dysfunction, unspecified vasculogenic erectile dysfunction type       Vitamin D3 400 UNITS Caps      Take 1 capsule by mouth 2 times daily        vitamin E 1000 UNIT capsule    TOCOPHEROL     Take 1,000 Units by mouth daily        ZOLOFT 100 MG tablet   Generic drug:  sertraline      Take 1.5 tablets by mouth daily

## 2017-12-22 RX ORDER — NAPROXEN 500 MG/1
TABLET ORAL
Qty: 60 TABLET | Refills: 2 | Status: ON HOLD | OUTPATIENT
Start: 2017-12-22 | End: 2018-01-04

## 2017-12-22 RX ORDER — FLUTICASONE PROPIONATE 50 MCG
SPRAY, SUSPENSION (ML) NASAL
Qty: 16 ML | Refills: 5 | Status: ON HOLD | OUTPATIENT
Start: 2017-12-22 | End: 2018-01-04

## 2017-12-22 ASSESSMENT — ANXIETY QUESTIONNAIRES: GAD7 TOTAL SCORE: 2

## 2017-12-22 NOTE — TELEPHONE ENCOUNTER
Requested Prescriptions   Pending Prescriptions Disp Refills     naproxen (NAPROSYN) 500 MG tablet [Pharmacy Med Name: NAPROXEN 500MG TABLETS] 60 tablet 0    Last Written Prescription Date:  11/21/17  Last Fill Quantity: 60,  # refills: 0   Last Office Visit with [facility] or [facility] prescribing provider:  12/21/17 (today)   Future Office Visit:      Sig: TAKE ONE TABLET BY MOUTH TWICE DAILY AS NEEDED FOR MODERATE PAIN    NSAID Medications Failed    12/18/2017 10:40 PM       Failed - Blood pressure under 140/90    BP Readings from Last 3 Encounters:   12/21/17 160/80   11/09/16 136/73   11/03/16 138/75                Failed - Normal ALT on file in past 12 months    Recent Labs   Lab Test  03/03/12   1305   ALT  32            Failed - Normal AST on file in past 12 months    Recent Labs   Lab Test  03/03/12   1305   AST  41            Failed - Recent or future visit with authorizing provider's specialty    Patient had office visit in the last year or has a visit in the next 30 days with authorizing provider.  See chart review.              Failed - Patient is age 6-64 years       Failed - Normal CBC on file in past 12 months    Recent Labs   Lab Test  11/03/16   1416   WBC  4.8   RBC  4.24*   HGB  14.1   HCT  39.0*   PLT  262            Failed - Normal serum creatinine on file in past 12 months    Recent Labs   Lab Test  11/03/16   1416   CR  1.03             fluticasone (FLONASE) 50 MCG/ACT spray [Pharmacy Med Name: FLUTICASONE 50MCG NASAL SP (120) RX] 16 mL 0        Last Written Prescription Date:  8/21/17  Last Fill Quantity: 16 ml,  # refills: 2   Last Office Visit with [facility] or [facility] prescribing provider:  12/21/17 (today)  Future Office Visit:      Sig: SHAKE LIQUID AND USE 2 SPRAYS IN EACH NOSTRIL DAILY    Inhaled Steroids Protocol Failed    12/18/2017 10:40 PM       Failed - Asthma control test 20 or greater in last 6 months    Please review ACT score.     No flowsheet data found.           Failed -  Recent (6 mo) or future visit with authorizing provider's specialty    Patient had office visit in the last 6 months or has a visit in the next 30 days with authorizing provider.  See chart review.            Passed - Patient is age 12 or older        Routing refill request to provider for review/approval because:  Labs not current:  AST/ALT, CBC, creatinine, elevated BP    Fern Gallegos RN  [facility]

## 2017-12-25 DIAGNOSIS — N40.1 BENIGN NON-NODULAR PROSTATIC HYPERPLASIA WITH LOWER URINARY TRACT SYMPTOMS: ICD-10-CM

## 2017-12-28 RX ORDER — TAMSULOSIN HYDROCHLORIDE 0.4 MG/1
CAPSULE ORAL
Qty: 30 CAPSULE | Refills: 5 | Status: ON HOLD | OUTPATIENT
Start: 2017-12-28 | End: 2018-01-04

## 2017-12-28 NOTE — TELEPHONE ENCOUNTER
Requested Prescriptions   Pending Prescriptions Disp Refills     tamsulosin (FLOMAX) 0.4 MG capsule [Pharmacy Med Name: TAMSULOSIN 0.4MG CAPSULES] 30 capsule 0     Sig: TAKE 1 CAPSULE BY MOUTH DAILY    Alpha Blockers Failed    12/25/2017 10:30 PM       Failed - BP is less than 140/90    BP Readings from Last 3 Encounters:   12/21/17 160/80   11/09/16 136/73   11/03/16 138/75                Failed - Patient does not have Tadalafil, Vardenafil, or Sildenafil on their medication list       Passed - Recent or future visit with authorizing provider's specialty    Patient had office visit in the last year or has a visit in the next 30 days with authorizing provider.  See chart review.              Passed - Patient is 18 years of age or older        Last refill 11/27/17  Last OV 12/9/17 except saw PCP for URI on 12/21/17    Routing refill request to provider for review/approval because:  BP not at goal  Patient was advised last refill to be seen for annual exam      Patient is on Freddie Astorga RN [facility] Triage.

## 2017-12-31 ENCOUNTER — HOSPITAL ENCOUNTER (EMERGENCY)
Facility: CLINIC | Age: 67
Discharge: PSYCHIATRIC OR SUBSTANCE USE FACILITY | End: 2018-01-01
Attending: EMERGENCY MEDICINE | Admitting: EMERGENCY MEDICINE
Payer: MEDICARE

## 2017-12-31 DIAGNOSIS — F10.10 ALCOHOL ABUSE: ICD-10-CM

## 2017-12-31 DIAGNOSIS — I10 ESSENTIAL HYPERTENSION WITH GOAL BLOOD PRESSURE LESS THAN 140/90: ICD-10-CM

## 2017-12-31 DIAGNOSIS — F33.9 RECURRENT MAJOR DEPRESSIVE DISORDER, REMISSION STATUS UNSPECIFIED (H): ICD-10-CM

## 2017-12-31 LAB
ALBUMIN SERPL-MCNC: 4.4 G/DL (ref 3.4–5)
ALP SERPL-CCNC: 75 U/L (ref 40–150)
ALT SERPL W P-5'-P-CCNC: 42 U/L (ref 0–70)
AMPHETAMINES UR QL SCN: NEGATIVE
ANION GAP SERPL CALCULATED.3IONS-SCNC: 6 MMOL/L (ref 3–14)
AST SERPL W P-5'-P-CCNC: 51 U/L (ref 0–45)
BARBITURATES UR QL: NEGATIVE
BASOPHILS # BLD AUTO: 0 10E9/L (ref 0–0.2)
BASOPHILS NFR BLD AUTO: 0.6 %
BENZODIAZ UR QL: POSITIVE
BILIRUB SERPL-MCNC: 0.7 MG/DL (ref 0.2–1.3)
BUN SERPL-MCNC: 28 MG/DL (ref 7–30)
CALCIUM SERPL-MCNC: 9.4 MG/DL (ref 8.5–10.1)
CANNABINOIDS UR QL SCN: NEGATIVE
CHLORIDE SERPL-SCNC: 103 MMOL/L (ref 94–109)
CO2 SERPL-SCNC: 28 MMOL/L (ref 20–32)
COCAINE UR QL: NEGATIVE
CREAT SERPL-MCNC: 0.86 MG/DL (ref 0.66–1.25)
DIFFERENTIAL METHOD BLD: ABNORMAL
EOSINOPHIL # BLD AUTO: 0.1 10E9/L (ref 0–0.7)
EOSINOPHIL NFR BLD AUTO: 1 %
ERYTHROCYTE [DISTWIDTH] IN BLOOD BY AUTOMATED COUNT: 12.7 % (ref 10–15)
ETHANOL SERPL-MCNC: <0.01 G/DL
GFR SERPL CREATININE-BSD FRML MDRD: 88 ML/MIN/1.7M2
GLUCOSE SERPL-MCNC: 96 MG/DL (ref 70–99)
HCT VFR BLD AUTO: 42.5 % (ref 40–53)
HGB BLD-MCNC: 14.9 G/DL (ref 13.3–17.7)
IMM GRANULOCYTES # BLD: 0 10E9/L (ref 0–0.4)
IMM GRANULOCYTES NFR BLD: 0.1 %
LYMPHOCYTES # BLD AUTO: 1.4 10E9/L (ref 0.8–5.3)
LYMPHOCYTES NFR BLD AUTO: 20.5 %
MCH RBC QN AUTO: 33.1 PG (ref 26.5–33)
MCHC RBC AUTO-ENTMCNC: 35.1 G/DL (ref 31.5–36.5)
MCV RBC AUTO: 94 FL (ref 78–100)
MONOCYTES # BLD AUTO: 0.5 10E9/L (ref 0–1.3)
MONOCYTES NFR BLD AUTO: 7.9 %
NEUTROPHILS # BLD AUTO: 4.7 10E9/L (ref 1.6–8.3)
NEUTROPHILS NFR BLD AUTO: 69.9 %
NRBC # BLD AUTO: 0 10*3/UL
NRBC BLD AUTO-RTO: 0 /100
OPIATES UR QL SCN: NEGATIVE
PCP UR QL SCN: NEGATIVE
PLATELET # BLD AUTO: 210 10E9/L (ref 150–450)
POTASSIUM SERPL-SCNC: 3.9 MMOL/L (ref 3.4–5.3)
PROT SERPL-MCNC: 7.6 G/DL (ref 6.8–8.8)
RBC # BLD AUTO: 4.5 10E12/L (ref 4.4–5.9)
SODIUM SERPL-SCNC: 137 MMOL/L (ref 133–144)
WBC # BLD AUTO: 6.7 10E9/L (ref 4–11)

## 2017-12-31 PROCEDURE — 25000132 ZZH RX MED GY IP 250 OP 250 PS 637: Mod: GY | Performed by: EMERGENCY MEDICINE

## 2017-12-31 PROCEDURE — 80307 DRUG TEST PRSMV CHEM ANLYZR: CPT | Performed by: EMERGENCY MEDICINE

## 2017-12-31 PROCEDURE — A9270 NON-COVERED ITEM OR SERVICE: HCPCS | Mod: GY | Performed by: EMERGENCY MEDICINE

## 2017-12-31 PROCEDURE — 80320 DRUG SCREEN QUANTALCOHOLS: CPT | Performed by: EMERGENCY MEDICINE

## 2017-12-31 PROCEDURE — 90791 PSYCH DIAGNOSTIC EVALUATION: CPT

## 2017-12-31 PROCEDURE — 80053 COMPREHEN METABOLIC PANEL: CPT | Performed by: EMERGENCY MEDICINE

## 2017-12-31 PROCEDURE — 99285 EMERGENCY DEPT VISIT HI MDM: CPT | Mod: 25

## 2017-12-31 PROCEDURE — 85025 COMPLETE CBC W/AUTO DIFF WBC: CPT | Performed by: EMERGENCY MEDICINE

## 2017-12-31 RX ORDER — LORAZEPAM 1 MG/1
1 TABLET ORAL ONCE
Status: COMPLETED | OUTPATIENT
Start: 2017-12-31 | End: 2017-12-31

## 2017-12-31 RX ADMIN — LORAZEPAM 1 MG: 1 TABLET ORAL at 22:03

## 2017-12-31 ASSESSMENT — ENCOUNTER SYMPTOMS
DYSPHORIC MOOD: 1
HALLUCINATIONS: 0

## 2017-12-31 NOTE — ED NOTES
Bed: [facility]  Expected date:   Expected time:   Means of arrival:   Comments:  Triage

## 2017-12-31 NOTE — ED PROVIDER NOTES
History     Chief Complaint:  Depression    HPI   Edwige Ochoa is a 67 year old male, with a history of depression and anxiety, who presents with his fiance to the ED for evaluation of depression. The patient's fiance reports the patient began drinking again 3 months ago due to stress from working again because of financial issues. The fiance notes the patient has drank 3 bottles of Vodka in the past 6 days. The fiance reports the patient has made excuses to not go to work the past 2 weeks and has been laying in bed for the past week; he is only getting up to use the bathroom or secretly drink. The fiance notes the patient has fallen multiple times due to intoxication and has bruises all over; there is no head trauma suspected. The fiance reports the patient may be taking extra doses of his medication but patient denies this, he has no SI or self harm thoughts. The patient reports his depression, stress, and alcoholism is draining him of energy. The patient notes his medications are not alleviating his symptoms or enabling him to sleep. The patient reports he has been seeing a psychiatrist monthly; however, he has not admitted to drinking due to being ashamed. The patient notes he would like to be admitted to receive help. The patient denies any suicidal ideation, homicidal ideation, hallucinations, tobacco use, substance use, or other physical symptoms.  Of note he does take ativan as needed for sleep.    Allergies:  No known drug allergies    Medications:    Flomax  Naprosyn  Prevacid  Viagra  Trazodone  Vitamin D3  Vitamin C  Vitamin E  Echinacea  Glucosamine-chondroitin   Omega 3-6-9 fatty acids  B complex  Zoloft  Remeron     Past Medical History:    Chronic allergic rhinitis  Obesity  HTN  Left rotator cuff complete tear   Bilateral rotator cuff tendonitis   Bilateral knee osteoarthritis   Insomnia  BPH w/ LUTS  GERD  Substance abuse  Cervicalgia  Allergic rhinitis  Anxiety  Arthritis  Depression  Blood  transfusion   IBS  Migraines   Asthma     Past Surgical History:    Tonsillectomy  Sinus surgery   Right hand/wrist surgery      Family History:    HTN  Heart disease  Cerebrovascular disease  Mental illness  Substance abuse     Social History:  Smoking status: Never smoker  Alcohol use: Yes  Presents to ED with fiance   Marital Status:   [4]     Review of Systems   Psychiatric/Behavioral: Positive for dysphoric mood. Negative for hallucinations and suicidal ideas.   All other systems reviewed and are negative.    Physical Exam     Patient Vitals for the past 24 hrs:   BP Temp Temp src Heart Rate Resp SpO2 Weight   12/31/17 2204 (!) 166/111 - - 91 18 96 % -   12/31/17 1937 - - - - - 96 % -   12/31/17 1936 (!) 180/98 - - - - 96 % -   12/31/17 1643 (!) 171/99 - - - 14 - -   12/31/17 1527 (!) 181/100 98.6  F (37  C) Oral 99 18 96 % 89.8 kg (198 lb)     Physical Exam  Physical Exam   General: Resting on the bed.  Head: No obvious trauma to head.  Ears, Nose, Throat:  External ears normal.  Nose normal.    Eyes:  Conjunctivae clear.  Pupils are equal, round, and reactive.   Neck: Normal range of motion.  Neck supple.   CV: Regular rate and rhythm.  No murmurs.      Respiratory: Effort normal and breath sounds normal.  No wheezing or crackles.   Gastrointestinal: Soft.  No distension. There is no tenderness.    Neuro: Alert. Moving all extremities appropriately.  Normal speech.  Motor strength intact.    Skin: Skin is warm and dry.  No rash noted.   Psych: Behavior is normal. reports flat affect and mood.  No SI or HI.  No AH or VH.      Emergency Department Course     Laboratory:  UDS: Benzodiazepine positive   Alcohol level blood: <0.01  CBC: o/w WNL (WBC 6.7, HGB 14.9, )  CMP: AST 51(H) o/w WNL (Creatinine 0.86)    Interventions:  2203: Ativan 1mg Oral     Emergency Department Course:  Past medical records, nursing notes, and vitals reviewed.  1543: I performed an exam of the patient and obtained  history, as documented above.  IV inserted and blood drawn.    I rechecked the patient. Explained findings to patient and fiance.    Findings and plan explained to the Patient and significant other. Patient will be transferred to [facility] via EMS. Dr. Herbert will admit the patient to a station 20 monitored bed for further monitoring, evaluation, and treatment.     Impression & Plan      Medical Decision Making:  Edwige Ochoa is a 67 year old male with a history of depression and alcohol abuse presenting with depression. Vital signs mildly hypertensive, but remainder unremarkable. Patient reports severe depression which he is self medicating with alcohol. He otherwise is not having suicidal, homicidal ideation. He otherwise is not caring for himself, not getting out of bed, not going to work. He denies any alcohol use today. ETOH negative.  He denies using street drugs or tobacco use. He appears to be caring for himself. He was discussed with the DEC worker. DEC felt that a voluntary admission for depression was appropriate. His last admission for depression was 5 years previous to today. His fiance is quite concerned about him. He's taking his mental health medications as prescribed. He reports no overdose. Discussed with behavioral health intake, they would like a CBC, CMP, and urine drug screen. His urine drug screen did show positive for Benzos, suspect this is secondary to ativan prescribed for sleep. Additionally, his blood pressure is elevated which in reviewing his past notes, he also does have elevated blood pressures. He's been reluctant to take his prescribed blood pressure medications in the past. He does not appear to be withdrawing from alcohol.  Plan to recheck. He is asymptomatic at this time. No indication for any acute changes or starting medications. Patient will be transferred to [place] for admission for depression. Advised close follow-up afterwards for blood pressure management. He  voiced understanding of the plan and was transferred in stable condition.     Diagnosis:    ICD-10-CM   1. Recurrent major depressive disorder, remission status unspecified (H) F33.9   2. Alcohol abuse F10.10   3. Essential hypertension with goal blood pressure less than 140/90 I10     Disposition: Patient transferred to [facility] via EMS to be admitted to a station 20 bed by Dr. Keon Rodriguez  12/31/2017    EMERGENCY DEPARTMENT    I, Lillian Rodriguez, am serving as a scribe at 3:43 PM on 12/31/2017 to document services personally performed by Melissa Cardona MD based on my observations and the provider's statements to me.        Melissa Cardona MD  01/01/18 [id number]

## 2018-01-01 ENCOUNTER — HOSPITAL ENCOUNTER (INPATIENT)
Facility: CLINIC | Age: 68
LOS: 4 days | Discharge: HOME | DRG: 885 | End: 2018-01-05
Attending: PSYCHIATRY & NEUROLOGY | Admitting: PSYCHIATRY & NEUROLOGY
Payer: MEDICARE

## 2018-01-01 VITALS
HEART RATE: 94 BPM | WEIGHT: 198 LBS | DIASTOLIC BLOOD PRESSURE: 119 MMHG | TEMPERATURE: 98.6 F | SYSTOLIC BLOOD PRESSURE: 185 MMHG | RESPIRATION RATE: 16 BRPM | OXYGEN SATURATION: 95 % | BODY MASS INDEX: 29.67 KG/M2

## 2018-01-01 DIAGNOSIS — N40.1 BENIGN NON-NODULAR PROSTATIC HYPERPLASIA WITH LOWER URINARY TRACT SYMPTOMS: ICD-10-CM

## 2018-01-01 DIAGNOSIS — F33.9 RECURRENT MAJOR DEPRESSIVE DISORDER, REMISSION STATUS UNSPECIFIED (H): ICD-10-CM

## 2018-01-01 DIAGNOSIS — F51.01 PRIMARY INSOMNIA: ICD-10-CM

## 2018-01-01 DIAGNOSIS — K59.00 CONSTIPATION, UNSPECIFIED CONSTIPATION TYPE: ICD-10-CM

## 2018-01-01 DIAGNOSIS — K21.9 GASTROESOPHAGEAL REFLUX DISEASE WITHOUT ESOPHAGITIS: ICD-10-CM

## 2018-01-01 DIAGNOSIS — F41.9 ANXIETY: ICD-10-CM

## 2018-01-01 DIAGNOSIS — E78.5 HYPERLIPIDEMIA WITH TARGET LDL LESS THAN 130: ICD-10-CM

## 2018-01-01 DIAGNOSIS — J31.0 CHRONIC RHINITIS, UNSPECIFIED TYPE: ICD-10-CM

## 2018-01-01 DIAGNOSIS — N52.9 VASCULOGENIC ERECTILE DYSFUNCTION, UNSPECIFIED VASCULOGENIC ERECTILE DYSFUNCTION TYPE: ICD-10-CM

## 2018-01-01 DIAGNOSIS — M54.2 CERVICALGIA: Primary | ICD-10-CM

## 2018-01-01 DIAGNOSIS — E66.811 OBESITY (BMI 30.0-34.9): ICD-10-CM

## 2018-01-01 PROBLEM — F32.A DEPRESSION: Status: ACTIVE | Noted: 2018-01-01

## 2018-01-01 PROCEDURE — 25000132 ZZH RX MED GY IP 250 OP 250 PS 637: Mod: GY | Performed by: PSYCHIATRY & NEUROLOGY

## 2018-01-01 PROCEDURE — A9270 NON-COVERED ITEM OR SERVICE: HCPCS | Mod: GY | Performed by: PSYCHIATRY & NEUROLOGY

## 2018-01-01 PROCEDURE — HZ2ZZZZ DETOXIFICATION SERVICES FOR SUBSTANCE ABUSE TREATMENT: ICD-10-PCS | Performed by: PSYCHIATRY & NEUROLOGY

## 2018-01-01 PROCEDURE — 12400001 ZZH R&B MH UMMC

## 2018-01-01 PROCEDURE — A9270 NON-COVERED ITEM OR SERVICE: HCPCS | Mod: GY | Performed by: STUDENT IN AN ORGANIZED HEALTH CARE EDUCATION/TRAINING PROGRAM

## 2018-01-01 PROCEDURE — 99223 1ST HOSP IP/OBS HIGH 75: CPT | Mod: GC | Performed by: PSYCHIATRY & NEUROLOGY

## 2018-01-01 PROCEDURE — 25000132 ZZH RX MED GY IP 250 OP 250 PS 637: Mod: GY | Performed by: STUDENT IN AN ORGANIZED HEALTH CARE EDUCATION/TRAINING PROGRAM

## 2018-01-01 RX ORDER — DIAZEPAM 5 MG
5-20 TABLET ORAL EVERY 30 MIN PRN
Status: DISCONTINUED | OUTPATIENT
Start: 2018-01-01 | End: 2018-01-04

## 2018-01-01 RX ORDER — SODIUM PHOSPHATE,MONO-DIBASIC 19G-7G/118
2 ENEMA (ML) RECTAL 2 TIMES DAILY
COMMUNITY
End: 2018-01-08

## 2018-01-01 RX ORDER — TAMSULOSIN HYDROCHLORIDE 0.4 MG/1
0.4 CAPSULE ORAL DAILY
Status: DISCONTINUED | OUTPATIENT
Start: 2018-01-01 | End: 2018-01-05 | Stop reason: HOSPADM

## 2018-01-01 RX ORDER — HYDROXYZINE HYDROCHLORIDE 25 MG/1
25-50 TABLET, FILM COATED ORAL EVERY 4 HOURS PRN
Status: DISCONTINUED | OUTPATIENT
Start: 2018-01-01 | End: 2018-01-05 | Stop reason: HOSPADM

## 2018-01-01 RX ORDER — NAPROXEN 250 MG/1
250 TABLET ORAL 2 TIMES DAILY WITH MEALS
Status: DISCONTINUED | OUTPATIENT
Start: 2018-01-01 | End: 2018-01-01

## 2018-01-01 RX ORDER — TRAZODONE HYDROCHLORIDE 100 MG/1
300 TABLET ORAL AT BEDTIME
Status: DISCONTINUED | OUTPATIENT
Start: 2018-01-01 | End: 2018-01-05 | Stop reason: HOSPADM

## 2018-01-01 RX ORDER — CLONAZEPAM 0.5 MG/1
2 TABLET ORAL AT BEDTIME
Status: DISCONTINUED | OUTPATIENT
Start: 2018-01-01 | End: 2018-01-05 | Stop reason: HOSPADM

## 2018-01-01 RX ORDER — CHLORAL HYDRATE 500 MG
1 CAPSULE ORAL DAILY
Status: DISCONTINUED | OUTPATIENT
Start: 2018-01-01 | End: 2018-01-05 | Stop reason: HOSPADM

## 2018-01-01 RX ORDER — MULTIVIT WITH MINERALS/LUTEIN
1000 TABLET ORAL DAILY
Status: DISCONTINUED | OUTPATIENT
Start: 2018-01-01 | End: 2018-01-05 | Stop reason: HOSPADM

## 2018-01-01 RX ORDER — OLANZAPINE 10 MG/2ML
10 INJECTION, POWDER, FOR SOLUTION INTRAMUSCULAR
Status: DISCONTINUED | OUTPATIENT
Start: 2018-01-01 | End: 2018-01-05 | Stop reason: HOSPADM

## 2018-01-01 RX ORDER — SODIUM PHOSPHATE,MONO-DIBASIC 19G-7G/118
1 ENEMA (ML) RECTAL 2 TIMES DAILY
Status: DISCONTINUED | OUTPATIENT
Start: 2018-01-01 | End: 2018-01-05 | Stop reason: HOSPADM

## 2018-01-01 RX ORDER — ASCORBIC ACID 500 MG
1000 TABLET ORAL 2 TIMES DAILY
Status: DISCONTINUED | OUTPATIENT
Start: 2018-01-01 | End: 2018-01-05 | Stop reason: HOSPADM

## 2018-01-01 RX ORDER — FLUTICASONE PROPIONATE 50 MCG
2 SPRAY, SUSPENSION (ML) NASAL DAILY
Status: DISCONTINUED | OUTPATIENT
Start: 2018-01-01 | End: 2018-01-05 | Stop reason: HOSPADM

## 2018-01-01 RX ORDER — NAPROXEN 250 MG/1
250 TABLET ORAL 2 TIMES DAILY PRN
Status: DISCONTINUED | OUTPATIENT
Start: 2018-01-01 | End: 2018-01-05 | Stop reason: HOSPADM

## 2018-01-01 RX ORDER — TRAZODONE HYDROCHLORIDE 300 MG/1
300 TABLET ORAL AT BEDTIME
Status: ON HOLD | COMMUNITY
End: 2018-01-04

## 2018-01-01 RX ORDER — MIRTAZAPINE 15 MG/1
30 TABLET, FILM COATED ORAL AT BEDTIME
Status: DISCONTINUED | OUTPATIENT
Start: 2018-01-01 | End: 2018-01-05 | Stop reason: HOSPADM

## 2018-01-01 RX ORDER — CLONAZEPAM 2 MG/1
2 TABLET ORAL ONCE
Status: COMPLETED | OUTPATIENT
Start: 2018-01-01 | End: 2018-01-01

## 2018-01-01 RX ORDER — IBUPROFEN 200 MG
400 TABLET ORAL EVERY 6 HOURS PRN
Status: DISCONTINUED | OUTPATIENT
Start: 2018-01-01 | End: 2018-01-05 | Stop reason: HOSPADM

## 2018-01-01 RX ORDER — FLURAZEPAM HCL 30 MG
30 CAPSULE ORAL AT BEDTIME
Status: ON HOLD | COMMUNITY
End: 2018-01-04

## 2018-01-01 RX ORDER — PANTOPRAZOLE SODIUM 20 MG/1
40 TABLET, DELAYED RELEASE ORAL DAILY
Status: DISCONTINUED | OUTPATIENT
Start: 2018-01-01 | End: 2018-01-05 | Stop reason: HOSPADM

## 2018-01-01 RX ORDER — SENNOSIDES 8.6 MG
2 TABLET ORAL DAILY
COMMUNITY

## 2018-01-01 RX ORDER — OLANZAPINE 10 MG/1
10 TABLET ORAL
Status: DISCONTINUED | OUTPATIENT
Start: 2018-01-01 | End: 2018-01-05 | Stop reason: HOSPADM

## 2018-01-01 RX ADMIN — TAMSULOSIN HYDROCHLORIDE 0.4 MG: 0.4 CAPSULE ORAL at 08:32

## 2018-01-01 RX ADMIN — MIRTAZAPINE 30 MG: 15 TABLET, FILM COATED ORAL at 03:24

## 2018-01-01 RX ADMIN — TRAZODONE HYDROCHLORIDE 300 MG: 100 TABLET ORAL at 03:13

## 2018-01-01 RX ADMIN — PANTOPRAZOLE SODIUM 40 MG: 20 TABLET, DELAYED RELEASE ORAL at 08:32

## 2018-01-01 RX ADMIN — OXYCODONE HYDROCHLORIDE AND ACETAMINOPHEN 1000 MG: 500 TABLET ORAL at 21:43

## 2018-01-01 RX ADMIN — CLONAZEPAM 2 MG: 0.5 TABLET ORAL at 21:42

## 2018-01-01 RX ADMIN — IBUPROFEN 400 MG: 200 TABLET, FILM COATED ORAL at 03:14

## 2018-01-01 RX ADMIN — FLUTICASONE PROPIONATE 2 SPRAY: 50 SPRAY, METERED NASAL at 08:40

## 2018-01-01 RX ADMIN — Medication 1 CAPSULE: at 08:40

## 2018-01-01 RX ADMIN — B-COMPLEX W/ C & FOLIC ACID TAB 1 TABLET: TAB at 08:32

## 2018-01-01 RX ADMIN — Medication 1 CAPSULE: at 21:42

## 2018-01-01 RX ADMIN — Medication 400 UNITS: at 08:32

## 2018-01-01 RX ADMIN — HYDROXYZINE HYDROCHLORIDE 50 MG: 25 TABLET ORAL at 03:14

## 2018-01-01 RX ADMIN — TRAZODONE HYDROCHLORIDE 300 MG: 100 TABLET ORAL at 21:43

## 2018-01-01 RX ADMIN — NAPROXEN 250 MG: 250 TABLET ORAL at 08:40

## 2018-01-01 RX ADMIN — OXYCODONE HYDROCHLORIDE AND ACETAMINOPHEN 1000 MG: 500 TABLET ORAL at 08:32

## 2018-01-01 RX ADMIN — Medication 1000 UNITS: at 08:32

## 2018-01-01 RX ADMIN — SERTRALINE HYDROCHLORIDE 150 MG: 100 TABLET ORAL at 08:32

## 2018-01-01 RX ADMIN — CLONAZEPAM 2 MG: 2 TABLET ORAL at 04:06

## 2018-01-01 RX ADMIN — Medication 1 G: at 08:32

## 2018-01-01 RX ADMIN — MIRTAZAPINE 30 MG: 15 TABLET, FILM COATED ORAL at 21:43

## 2018-01-01 ASSESSMENT — ACTIVITIES OF DAILY LIVING (ADL)
SWALLOWING: 0 - SWALLOWS FOODS/LIQUIDS WITHOUT DIFFICULTY
EATING: 0 - INDEPENDENT
SWALLOWING: 0-->SWALLOWS FOODS/LIQUIDS WITHOUT DIFFICULTY
AMBULATION: 0 - INDEPENDENT
COMMUNICATION: 0 - UNDERSTANDS/COMMUNICATES WITHOUT DIFFICULTY
AMBULATION: 0-->INDEPENDENT
DRESS: 0-->INDEPENDENT
TOILETING: 0 - INDEPENDENT
FALL_HISTORY_WITHIN_LAST_SIX_MONTHS: NO
GROOMING: INDEPENDENT
DRESS: INDEPENDENT
RETIRED_COMMUNICATION: 0-->UNDERSTANDS/COMMUNICATES WITHOUT DIFFICULTY
BATHING: 0-->INDEPENDENT
TOILETING: 0-->INDEPENDENT
TRANSFERRING: 0 - INDEPENDENT
DRESS: 0 - INDEPENDENT
CHANGE_IN_FUNCTIONAL_STATUS_SINCE_ONSET_OF_CURRENT_ILLNESS/INJURY: YES
ORAL_HYGIENE: INDEPENDENT
RETIRED_EATING: 0-->INDEPENDENT
BATHING: 0 - INDEPENDENT
TRANSFERRING: 0-->INDEPENDENT
COGNITION: 0 - NO COGNITION ISSUES REPORTED

## 2018-01-01 NOTE — IP AVS SNAPSHOT
MRN:[id number]                      After Visit Summary   1/1/2018    Edwige Ochoa    MRN: [id number]           Thank you!     Thank you for choosing [facility] for your care. Our goal is always to provide you with excellent care.        Patient Information     Date Of Birth          1950        Designated Caregiver       Most Recent Value    Caregiver    Will someone help with your care after discharge? no      About your hospital stay     You were admitted on:  January 1, 2018 You last received care in the:  [facility]    You were discharged on:  January 5, 2018       Who to Call     For medical emergencies, please call 911.  For non-urgent questions about your medical care, please call your primary care provider or clinic, [phone]          Attending Provider     Provider Specialty    Chauncey Herbert MD Psychiatry       Primary Care Provider Office Phone # Fax #    Frandy Page [phone]      Further instructions from your care team       Behavioral Discharge Planning and Instructions    Summary:  You were admitted to [facility] on 12/31/18 with worsening depression in the context of ongoing alcohol use/intoxication under the care of Dr. Herbert with      You were monitored closely for alcohol withdrawal without complication. You met with Dr. Herbert and his team daily for ongoing psychiatric assessment and medication management.  You had opportunities to participate in therapeutic groups on the unit.   At this time you report your mood has stabilized and you report you are not having thoughts or intent to harm yourself or others. You will be discharged home and will resume care with your outpatient providers.  You are strongly encouraged to complete a CD assessment for chemical dependence treatment placement.    Main Diagnosis:   Alcohol Dependence  Major Depressive Disorder    Major Treatments, Procedures and Findings:   Medications were  managed throughout  your stay. An internal medicine consult was completed during your stay. You had the opportunity to participate in treatment programming while on the unit including occupational therapy, mental health support and education and spiritual services.     Symptoms to Report:   Please report if you are experiencing increased aggression and/or confusion, problematic loss of sleep, worsening mood, or thoughts of suicide to your treatment team or notify your primary provider.   IF THE SYMPTOMS YOU ARE EXPERIENCING ARE A MEDICAL EMERGENCY, CALL 911 IMMEDIATELY    Lifestyle Adjustment:   1. Adjust your lifestyle to get enough sleep, relaxation, exercise and good nutrition.  Continue to develop healthy coping skills to decrease stress and promote a healthy and sober lifestyle.  2. Abstain from all substances of abuse.  Attend AA meetings daily while you await treatment.  3. Take medications as prescribed.  Please work with your doctor to discuss any concerns you have with your medications or side effects you may be experiencing.  4. Follow up with appointments as scheduled.        Psychiatry Follow-up:   Appointment: Psychiatry: Zelda Mendoza CNS:  1/9/18 at 5pm;  2/12/18 at 8:00pm  [facility]  [address], [address]  [place] [address]   Phone [phone]    Fax [phone]     You can go to the following sites for a Rule 25 assessment:  [facility]: [phone]  Walk-in assessments, and assessments by appointment per the following schedule:: [address] [place] [address]  Monday thru Friday, 7:00am to 2:15pm  [address], [place] [address]    Saturday s, 7:45am to 10:45am    [facility] Call to schedule an appt [phone]  [address] [facility] [address]          Resources:   *[facility] Crisis: COPE: ([phone]) 24 hour mobile crisis support for people having a mental health crisis in [place]  "[facility].   *Acute Psychiatric Services ([phone]). 24-hour walk-in crisis psychiatric support at [facility]; Emergency Medications Clinic available 7:30am - 2:00pm  *Sdkq4prlb: Text  \"life\"  to [phone]   A trained crisis counselor will respond immediately  *Crisis Connection: ([phone])  24-hour confidential telephone counseling   *[facility] Emergency Room: [phone]  *[place] [facility] ([facility]) : [facility] connects people seeking recovery to resources that help foster and sustain long-term recovery. Whether you are seeking resources for treatment, transportation, housing, job training, education, health or other pathways to recovery, [facility] is a great place to start.    [phone]      [email or web address]     General Medication Instructions:   See your medication sheet(s) for instructions.   Take all medicines as directed.  Make no changes unless your doctor suggests them.   Go to all your doctor visits.  Be sure to have all your required lab tests. This way, your medicines can be refilled on time.  Do not use any drugs not prescribed by your doctor.  Avoid alcohol.      Pending Results     No orders found from 12/30/2017 to 1/2/2018.            Statement of Approval     Ordered          01/05/18 9327  I have reviewed and agree with all the recommendations and orders detailed in this document.  EFFECTIVE NOW     Approved and electronically signed by:  Frandy Esquivel MD             Admission Information     Date & Time Provider Department Dept. Phone    1/1/2018 Chauncey Herbert MD  [facility] [phone]      Your Vitals Were     Blood Pressure Pulse Temperature Respirations Height Weight    150/83 95 98.5  F (36.9  C) (Oral) 16 1.753 m (5' 9\") 85.4 kg (188 lb 4.8 oz)    Pulse Oximetry BMI (Body Mass Index)                99% 27.81 kg/m2          MyChart Information     [facility] gives you secure access to your electronic health record. If you see a " primary care provider, you can also send messages to your care team and make appointments. If you have questions, please call your primary care clinic.  If you do not have a primary care provider, please call [phone] and they will assist you.        Care EveryWhere ID     This is your Care EveryWhere ID. This could be used by other organizations to access your [facility] medical records  [id number]        Equal Access to Services     LUDWIN VARELA : Hadii lars ortizo Sobonny, waaxda luqadaha, qaybta kaalmada adedaltonesteeda, maria d castroberlinmanohar esparza. So [facility] [phone].    ATENCIÓN: Si habla jamel, tiene a carnes disposición servicios gratuitos de asistencia lingüística. Cole al [phone].    We comply with applicable federal civil rights laws and [place] laws. We do not discriminate on the basis of race, color, national origin, age, disability, sex, sexual orientation, or gender identity.               Review of your medicines      START taking        Dose / Directions    clonazePAM 2 MG tablet   Commonly known as:  klonoPIN   Used for:  Primary insomnia        Dose:  2 mg   Take 1 tablet (2 mg) by mouth At Bedtime   Quantity:  30 tablet   Refills:  0       fish oil-omega-3 fatty acids 1000 MG capsule   Used for:  Hyperlipidemia with target LDL less than 130        Dose:  1 g   Take 1 capsule (1 g) by mouth daily   Quantity:  30 capsule   Refills:  0       hydrOXYzine 25 MG tablet   Commonly known as:  ATARAX   Used for:  Anxiety        Dose:  25-50 mg   Take 1-2 tablets (25-50 mg) by mouth every 4 hours as needed for anxiety   Quantity:  120 tablet   Refills:  0       ibuprofen 400 MG tablet   Commonly known as:  ADVIL/MOTRIN   Used for:  Cervicalgia   Replaces:  ibuprofen 200 MG capsule        Dose:  400 mg   Take 1 tablet (400 mg) by mouth every 6 hours as needed for moderate pain   Quantity:  100 tablet   Refills:  0       vitamin B complex with vitamin C Tabs tablet   Used for:   Obesity (BMI 30.0-34.9)   Replaces:  B Complex Tabs        Dose:  1 tablet   Take 1 tablet by mouth daily   Quantity:  30 tablet   Refills:  0         CONTINUE these medicines which may have CHANGED, or have new prescriptions. If we are uncertain of the size of tablets/capsules you have at home, strength may be listed as something that might have changed.        Dose / Directions    albuterol 108 (90 BASE) MCG/ACT Inhaler   Commonly known as:  VENTOLIN HFA   This may have changed:  See the new instructions.   Used for:  Chronic rhinitis, unspecified type        Dose:  1-2 puff   Inhale 1-2 puffs into the lungs every 6 hours as needed for wheezing   Quantity:  1 Inhaler   Refills:  0       fluticasone 50 MCG/ACT spray   Commonly known as:  FLONASE   This may have changed:  See the new instructions.   Used for:  Chronic rhinitis, unspecified type        Dose:  2 spray   Spray 2 sprays into both nostrils daily   Quantity:  1 Bottle   Refills:  0       * glucosamine-chondroitin 500-400 MG Caps per capsule   This may have changed:  Another medication with the same name was changed. Make sure you understand how and when to take each.        Dose:  2 capsule   Take 2 capsules by mouth 2 times daily   Refills:  0       * glucosamine-chondroitin 500-400 MG Caps per capsule   This may have changed:    - medication strength  - how much to take  - additional instructions   Used for:  Cervicalgia        Dose:  1 capsule   Take 1 capsule by mouth 2 times daily   Quantity:  60 capsule   Refills:  0       LANsoprazole 30 MG CR capsule   Commonly known as:  PREVACID   This may have changed:  how much to take   Used for:  Gastroesophageal reflux disease without esophagitis        Dose:  120 mg   Take 4 capsules (120 mg) by mouth daily Take 30-60 minutes before a meal.   Quantity:  120 capsule   Refills:  0       naproxen 250 MG tablet   Commonly known as:  NAPROSYN   This may have changed:  See the new instructions.   Used for:   Cervicalgia        Dose:  250 mg   Take 1 tablet (250 mg) by mouth 2 times daily as needed for moderate pain   Quantity:  60 tablet   Refills:  0       sertraline 50 MG tablet   Commonly known as:  ZOLOFT   This may have changed:  medication strength        Dose:  150 mg   Take 3 tablets (150 mg) by mouth daily   Quantity:  90 tablet   Refills:  0       tamsulosin 0.4 MG capsule   Commonly known as:  FLOMAX   This may have changed:  See the new instructions.   Used for:  Benign non-nodular prostatic hyperplasia with lower urinary tract symptoms        Dose:  0.4 mg   Take 1 capsule (0.4 mg) by mouth daily   Quantity:  30 capsule   Refills:  0       traZODone HCl 300 MG Tabs   This may have changed:    - medication strength  - how much to take  - Another medication with the same name was removed. Continue taking this medication, and follow the directions you see here.   Used for:  Primary insomnia        Dose:  300 mg   Take 300 mg by mouth At Bedtime   Quantity:  30 tablet   Refills:  0       * Notice:  This list has 2 medication(s) that are the same as other medications prescribed for you. Read the directions carefully, and ask your doctor or other care provider to review them with you.      CONTINUE these medicines which have NOT CHANGED        Dose / Directions    Echinacea Caps        Dose:  1520 mg   Take 1,520 mg by mouth 2 times daily   Refills:  0       mirtazapine 30 MG tablet   Commonly known as:  REMERON   Used for:  Primary insomnia        Dose:  30 mg   Take 1 tablet (30 mg) by mouth At Bedtime   Quantity:  30 tablet   Refills:  0       Omega 3-6-9 Fatty Acids Caps        Dose:  1 capsule   Take 1 capsule by mouth 2 times daily   Refills:  0       sennosides 8.6 MG tablet   Commonly known as:  SENOKOT        Dose:  2 tablet   Take 2 tablets by mouth daily   Refills:  0       VIAGRA 100 MG tablet   Used for:  Vasculogenic erectile dysfunction, unspecified vasculogenic erectile dysfunction type   Generic  drug:  sildenafil        Dose:   mg   Take 0.5-1 tablets ( mg) by mouth daily as needed for erectile dysfunction Take 30 min to 4 hours before intercourse.  Never use with nitroglycerin, terazosin or doxazosin.   Quantity:  12 tablet   Refills:  5       vitamin E 1000 UNIT capsule   Commonly known as:  TOCOPHEROL   Used for:  Hyperlipidemia with target LDL less than 130        Dose:  1000 Units   Take 1 capsule (1,000 Units) by mouth daily   Quantity:  30 capsule   Refills:  0         STOP taking     acetaminophen 500 MG tablet   Commonly known as:  TYLENOL           ascorbic acid 1000 MG Tabs   Commonly known as:  vitamin C           B Complex Tabs   Replaced by:  vitamin B complex with vitamin C Tabs tablet           flurazepam HCl 30 MG Caps           ibuprofen 200 MG capsule   Replaced by:  ibuprofen 400 MG tablet           Vitamin D3 400 UNITS Caps                Where to get your medicines      These medications were sent to [facility] [address]  [address] [address]     Phone:  [phone]     albuterol 108 (90 BASE) MCG/ACT Inhaler    fluticasone 50 MCG/ACT spray    glucosamine-chondroitin 500-400 MG Caps per capsule    hydrOXYzine 25 MG tablet    ibuprofen 400 MG tablet    LANsoprazole 30 MG CR capsule    mirtazapine 30 MG tablet    naproxen 250 MG tablet    sertraline 50 MG tablet    tamsulosin 0.4 MG capsule    traZODone HCl 300 MG Tabs    vitamin B complex with vitamin C Tabs tablet    vitamin E 1000 UNIT capsule         Some of these will need a paper prescription and others can be bought over the counter. Ask your nurse if you have questions.     Bring a paper prescription for each of these medications     clonazePAM 2 MG tablet    fish oil-omega-3 fatty acids 1000 MG capsule                Protect others around you: Learn how to safely use, store and throw away your medicines at www.disposemymeds.org.             Medication  List: This is a list of all your medications and when to take them. Check marks below indicate your daily home schedule. Keep this list as a reference.      Medications           Morning Afternoon Evening Bedtime As Needed    albuterol 108 (90 BASE) MCG/ACT Inhaler   Commonly known as:  VENTOLIN HFA   Inhale 1-2 puffs into the lungs every 6 hours as needed for wheezing   Last time this was given:  2 puffs on 1/5/2018  9:33 AM                                clonazePAM 2 MG tablet   Commonly known as:  klonoPIN   Take 1 tablet (2 mg) by mouth At Bedtime   Last time this was given:  2 mg on 1/4/2018 10:05 PM                                Echinacea Caps   Take 1,520 mg by mouth 2 times daily                                fish oil-omega-3 fatty acids 1000 MG capsule   Take 1 capsule (1 g) by mouth daily   Last time this was given:  1 g on 1/5/2018  9:35 AM                                fluticasone 50 MCG/ACT spray   Commonly known as:  FLONASE   Spray 2 sprays into both nostrils daily   Last time this was given:  2 sprays on 1/5/2018  9:34 AM                                * glucosamine-chondroitin 500-400 MG Caps per capsule   Take 2 capsules by mouth 2 times daily   Last time this was given:  1 capsule on 1/5/2018  9:36 AM                                * glucosamine-chondroitin 500-400 MG Caps per capsule   Take 1 capsule by mouth 2 times daily   Last time this was given:  1 capsule on 1/5/2018  9:36 AM                                hydrOXYzine 25 MG tablet   Commonly known as:  ATARAX   Take 1-2 tablets (25-50 mg) by mouth every 4 hours as needed for anxiety   Last time this was given:  50 mg on 1/1/2018  3:14 AM                                ibuprofen 400 MG tablet   Commonly known as:  ADVIL/MOTRIN   Take 1 tablet (400 mg) by mouth every 6 hours as needed for moderate pain   Last time this was given:  400 mg on 1/1/2018  3:14 AM                                LANsoprazole 30 MG CR capsule   Commonly known as:   PREVACID   Take 4 capsules (120 mg) by mouth daily Take 30-60 minutes before a meal.                                mirtazapine 30 MG tablet   Commonly known as:  REMERON   Take 1 tablet (30 mg) by mouth At Bedtime   Last time this was given:  30 mg on 1/4/2018 10:06 PM                                naproxen 250 MG tablet   Commonly known as:  NAPROSYN   Take 1 tablet (250 mg) by mouth 2 times daily as needed for moderate pain   Last time this was given:  250 mg on 1/1/2018  8:40 AM                                Omega 3-6-9 Fatty Acids Caps   Take 1 capsule by mouth 2 times daily                                sennosides 8.6 MG tablet   Commonly known as:  SENOKOT   Take 2 tablets by mouth daily                                sertraline 50 MG tablet   Commonly known as:  ZOLOFT   Take 3 tablets (150 mg) by mouth daily   Last time this was given:  150 mg on 1/5/2018  9:35 AM                                tamsulosin 0.4 MG capsule   Commonly known as:  FLOMAX   Take 1 capsule (0.4 mg) by mouth daily   Last time this was given:  0.4 mg on 1/5/2018  9:35 AM                                traZODone HCl 300 MG Tabs   Take 300 mg by mouth At Bedtime   Last time this was given:  300 mg on 1/4/2018 10:06 PM                                VIAGRA 100 MG tablet   Take 0.5-1 tablets ( mg) by mouth daily as needed for erectile dysfunction Take 30 min to 4 hours before intercourse.  Never use with nitroglycerin, terazosin or doxazosin.   Generic drug:  sildenafil                                vitamin B complex with vitamin C Tabs tablet   Take 1 tablet by mouth daily   Last time this was given:  1 tablet on 1/5/2018  9:35 AM                                vitamin E 1000 UNIT capsule   Commonly known as:  TOCOPHEROL   Take 1 capsule (1,000 Units) by mouth daily   Last time this was given:  1,000 Units on 1/5/2018  9:36 AM                                * Notice:  This list has 2 medication(s) that are the same as other  medications prescribed for you. Read the directions carefully, and ask your doctor or other care provider to review them with you.

## 2018-01-01 NOTE — PROGRESS NOTES
SPIRITUAL HEALTH SERVICES  SPIRITUAL ASSESSMENT Progress Note  [facility] [place]  ON-CALL VISIT    REFERRAL SOURCE: Patient requested a [facility] encounter     Attempted visit but patient was sleeping.    PLAN: Unit  will check in on patient tomorrow.     Brittany Kennedy  Staff   Pager [phone]

## 2018-01-01 NOTE — PROGRESS NOTES
Initial Psychosocial Assessment    I have reviewed the chart, met with the patient, and developed Care Plan. Information for assessment was obtained from: Pt, medical record      Presenting Problem:  Pt transferred from [facility] for significant alcohol use, low mood, decreased energy, poor sleep and lack of concentration. These symptoms have negatively impacted functioning.      History of Mental Health and Chemical Dependency:  2012 [facility]    Teen Challenge 2012 and 2011    Pt relapsed on alcohol three months ago.  He denies drug use.      Significant Life Events   (Illness, Abuse, Trauma, Death):    Family: born in [facility] until age 8 then to [place]. Parents were missionaries.  and  twice. He has two adult children ages 41 & 38.    Living Situation: lives with [facility]      Educational Background: attended [employer] and [employer]       Financial Status: experiencing financial stress-new job at [employer]    Legal Issues:  5 prior DUI's    Ethnic/Cultural Issues:     Spiritual Orientation:  Congregation     Service History: none    Social Functioning (organization, interests):    [facility]      Social Service Assessment/Plan:     Provide safe environment  CTC to explore options for treatment/follow up  Manage meds per psychiatry  Offer groups for coping skills

## 2018-01-01 NOTE — IP AVS SNAPSHOT
[address] [address]    Phone:  [phone]                                       After Visit Summary   1/1/2018    Edwige Ochoa    MRN: [id number]           After Visit Summary Signature Page     I have received my discharge instructions, and my questions have been answered. I have discussed any challenges I see with this plan with the nurse or doctor.    ..........................................................................................................................................  Patient/Patient Representative Signature      ..........................................................................................................................................  Patient Representative Print Name and Relationship to Patient    ..................................................               ................................................  Date                                            Time    ..........................................................................................................................................  Reviewed by Signature/Title    ...................................................              ..............................................  Date                                                            Time

## 2018-01-01 NOTE — H&P
"History and Physical    Edwige Ochoa MRN# [id number]   Age: 67 year old YOB: 1950     Date of Admission:  12/31/2017          Contacts:     Mental Health Provider: [facility]  Primary Physician:  Frandy Page         Chief Complaint:   \"I have a unique brain\"         History of Present Illness:     History obtained via chart review and patient interview.    The patient is a 68 y/o male with hx of previously diagnosed major depression, generalized anxiety, and alcohol use disorder who presented to the [facility] ED describing worsening depressive symptoms in the context of significant alcohol use. The patient was medically cleared for admission to the mental health unit.    Patient reported to ED personnel that he was experiencing worsening depressive symptoms over the past several weeks, including decreased mood, energy, and concentration, and increased difficulty with sleeping. Patient noted that things have worsened even more over the past two weeks, saying that he was having trouble even getting out of bed in the morning.    Patient presented to ED with his fiance. Fiance reported that patient has a history of significant alcohol use and that he had started drinking again about three months ago because of financial stressors and work. Fiance noted that patient hasn't been getting out of bed except to use the bathroom and drink; he has reportedly had multiple falls. She thinks he has consumed three bottles of vodka in the past six days. She is unsure about how accurately patient is taking medication. Patient does see a psychiatrist regularly but had not told psychiatrist that he was drinking again.    Upon interview, patient confirms the above history. He reports that he feels his biggest problem is anxiety. He says that his brain is always going and that this makes it difficult for him to relax, particularly when trying to fall asleep. He is on a number " "of medications for sleep, including trazodone, mirtazapine, clonazepam, and flurazepam. Patient feels his most recent problems began about three months ago, when he started working a new job at [employer]. Patient had been retired previously but decided to go back to work because his fiance was going back to school and they needed some extra money. Patient reports significantly increased amounts of stress because he is never able to get a break during the day. It takes him a long time to unwind when he gets home. He attributes this partially to his anxiety and his \"unique brain\" - patient mentioned that he has an IQ of 164. In order to help deal with the heightened anxiety, patient started drinking small amounts of vodka at night to help with sleep. Patient had previously been sober for four or five years. His alcohol use increased to the point where he had 3 full bottles of vodka over the past week. His fiance found out about the alcohol use 3-4 days ago; patient has not had a drink over the past three days. He is open to pursuing CD treatment and would like help with his sleep problems. He denies thoughts of SI, HI, and SIB.         Psychiatric Review of Systems:     Depression:   Reports: depressed mood, decreased interest, changes in sleep, changes in appetite, impaired concentration, decreased energy, irritability.   Denies: suicidal thoughts, thoughts of self-harm.  Leona:   Denies: sleeplessness, impulsiveness (spending, driving recklessly, change in sexual activity), racing thoughts, increased goal-directed activities, pressured speech, grandiose delusions.  Psychosis:   Denies: visual hallucinations, auditory hallucinations, paranoia, grandiosity, ideas of reference, thought insertions, thought broadcasting.  Anxiety:   Reports: worries, panic symptoms           Medical Review of Systems:   The Review of Systems is negative other than noted in the HPI         Psychiatric History:     Prior diagnoses: " MDD, JENNY, alcohol use disorder    Hospitalizations: x1 previously at [facility] in 2012    Suicide attempts: Denies    Self-injurious behavior: Denies    Violence: Denies    ECT: Denies    Past medications: Flurazepam, lorazepam, clonazepam, quetiapine, sertraline, trazodone, mirtazapine, citalopram, bupropion, venlafaxine, duloxetine, fluoxetine         Substance Use History:     Patient has significant history of alcohol use. He reports his first sustained episode with alcohol was in 2011 and that he went to [facility] for treatment. He was sober for about a year and half following this treatment. He had a second episode in 2012; he was eventually hospitalized at [facility] for depression and went to [facility] again following discharge. He graduated from the program in February 2013 and had remained sober up to most recent episode. He has had at least five previous DUIs.     He denies use of other recreational substances including marijuana, cocaine, heroin, opiate pills, meth, and stimulants. He denies tobacco use.         Past Medical History:     Past Medical History:   Diagnosis Date     Allergic RHINITIS      Anxiety      Arthritis 60 yrs old     Depressive disorder      Elevated prostate specific antigen (PSA)     benign bx's 5/12 at [facility]     Erectile dysfunction      H/O: substance abuse     mainly alcohol went to [facility] in 2012     History of blood transfusion 9 mos old     Hyperlipidemia LDL goal < 130      Hypertension goal BP (blood pressure) < 140/90      IBS (irritable bowel syndrome)     EGD 6/08, colonoscopy 9/07     Impaired fasting glucose 11/9/2016     Insomnia      Migraine headaches      Uncomplicated asthma 9 mos. old    have not had an attack since I was [age].     Past Surgical History:   Procedure Laterality Date     COLONOSCOPY       right hand/wrist surgery      due to injury     SINUS SURGERY  1995     TONSILLECTOMY       Patient reports history of  withdrawal seizures in 2012.       Allergies:    No Known Allergies       Medications:       Current Facility-Administered Medications on File Prior to Encounter:  [COMPLETED] LORazepam (ATIVAN) tablet 1 mg     Current Outpatient Prescriptions on File Prior to Encounter:  tamsulosin (FLOMAX) 0.4 MG capsule TAKE 1 CAPSULE BY MOUTH DAILY   naproxen (NAPROSYN) 500 MG tablet TAKE ONE TABLET BY MOUTH TWICE DAILY AS NEEDED FOR MODERATE PAIN   fluticasone (FLONASE) 50 MCG/ACT spray SHAKE LIQUID AND USE 2 SPRAYS IN EACH NOSTRIL DAILY   VENTOLIN  (90 BASE) MCG/ACT Inhaler INHALE 2 PUFFS INTO THE LUNGS EVERY 6 HOURS AS NEEDED FOR SHORTNESS OF BREATH OR DIFFICULT BREATHING OR WHEEZING (Patient not taking: Reported on 12/21/2017)   LANsoprazole (PREVACID) 30 MG capsule Take 1 capsule (30 mg) by mouth daily Take 30-60 minutes before a meal.   sildenafil (VIAGRA) 100 MG tablet Take 0.5-1 tablets ( mg) by mouth daily as needed for erectile dysfunction Take 30 min to 4 hours before intercourse.  Never use with nitroglycerin, terazosin or doxazosin.   traZODone (DESYREL) 150 MG tablet Take 3 tablets (450 mg) by mouth At Bedtime (Patient taking differently: Take 300 mg by mouth At Bedtime )   Cholecalciferol (VITAMIN D3) 400 UNITS CAPS Take 1 capsule by mouth 2 times daily   ascorbic acid (VITAMIN C) 1000 MG TABS Take 1,000 mg by mouth 2 times daily   vitamin E (TOCOPHEROL) 1000 UNIT capsule Take 1,000 Units by mouth daily   Echinacea CAPS Take 1,520 mg by mouth 2 times daily   GLUCOSAMINE-CHONDROITIN PO Take 2 tablets by mouth 2 times daily 1500/1200mg   Omega 3-6-9 Fatty Acids CAPS Take 1 capsule by mouth 2 times daily   B Complex TABS Take 1 tablet by mouth daily    sertraline (ZOLOFT) 100 MG tablet Take 1.5 tablets by mouth daily   acetaminophen (TYLENOL) 500 MG tablet Take 1,000 mg by mouth as needed    ibuprofen 200 MG capsule Take 600 mg by mouth as needed    mirtazapine (REMERON) 30 MG tablet Take 1 tablet by  mouth At Bedtime.          Social History:     Patient's parents worked as missionaries through the [employer]; as a result, patient was born in [facility] and lived there until he was [age] years old. They eventually left [facility] and moved to [place] in [place]. They moved around to different churches in [place] when he was a child. Patient attended [employer] and [email or web address] school at different points. He has previously worked in retail and as a . He has been  twice previously. He was  to first wife for thirty years. He was  to second wife for eight years. He has been homeless in the past. He has two adult children, aged 41 and 38, and two grandchildren, aged 22 and 7. He is currently living in [place] with his fiance. He started working at [employer] three months ago; he has been on leave more recently. He has history of past legal trouble with DUIs.         Family History:     Mother - depression  Daughter - depression    Family History   Problem Relation Age of Onset     HEART DISEASE Father      Hypertension Father      CEREBROVASCULAR DISEASE Mother      MENTAL ILLNESS Mother      HEART DISEASE Maternal Grandfather      MENTAL ILLNESS Daughter      Substance Abuse Daughter             Labs:     Recent Results (from the past 24 hour(s))   Drug abuse screen 77 urine ([place],RH,SH)    Collection Time: 12/31/17  4:06 PM   Result Value Ref Range    Amphetamine Qual Urine Negative NEG^Negative    Barbiturates Qual Urine Negative NEG^Negative    Benzodiazepine Qual Urine Positive (A) NEG^Negative    Cannabinoids Qual Urine Negative NEG^Negative    Cocaine Qual Urine Negative NEG^Negative    Opiates Qualitative Urine Negative NEG^Negative    PCP Qual Urine Negative NEG^Negative   CBC with platelets differential    Collection Time: 12/31/17  7:35 PM   Result Value Ref Range    WBC 6.7 4.0 - 11.0 10e9/L    RBC Count 4.50 4.4 - 5.9 10e12/L    Hemoglobin 14.9 13.3 - 17.7  "g/dL    Hematocrit 42.5 40.0 - 53.0 %    MCV 94 78 - 100 fl    MCH 33.1 (H) 26.5 - 33.0 pg    MCHC 35.1 31.5 - 36.5 g/dL    RDW 12.7 10.0 - 15.0 %    Platelet Count 210 150 - 450 10e9/L    Diff Method Automated Method     % Neutrophils 69.9 %    % Lymphocytes 20.5 %    % Monocytes 7.9 %    % Eosinophils 1.0 %    % Basophils 0.6 %    % Immature Granulocytes 0.1 %    Nucleated RBCs 0 0 /100    Absolute Neutrophil 4.7 1.6 - 8.3 10e9/L    Absolute Lymphocytes 1.4 0.8 - 5.3 10e9/L    Absolute Monocytes 0.5 0.0 - 1.3 10e9/L    Absolute Eosinophils 0.1 0.0 - 0.7 10e9/L    Absolute Basophils 0.0 0.0 - 0.2 10e9/L    Abs Immature Granulocytes 0.0 0 - 0.4 10e9/L    Absolute Nucleated RBC 0.0    Comprehensive metabolic panel    Collection Time: 12/31/17  7:35 PM   Result Value Ref Range    Sodium 137 133 - 144 mmol/L    Potassium 3.9 3.4 - 5.3 mmol/L    Chloride 103 94 - 109 mmol/L    Carbon Dioxide 28 20 - 32 mmol/L    Anion Gap 6 3 - 14 mmol/L    Glucose 96 70 - 99 mg/dL    Urea Nitrogen 28 7 - 30 mg/dL    Creatinine 0.86 0.66 - 1.25 mg/dL    GFR Estimate 88 >60 mL/min/1.7m2    GFR Estimate If Black >90 >60 mL/min/1.7m2    Calcium 9.4 8.5 - 10.1 mg/dL    Bilirubin Total 0.7 0.2 - 1.3 mg/dL    Albumin 4.4 3.4 - 5.0 g/dL    Protein Total 7.6 6.8 - 8.8 g/dL    Alkaline Phosphatase 75 40 - 150 U/L    ALT 42 0 - 70 U/L    AST 51 (H) 0 - 45 U/L   Alcohol level blood    Collection Time: 12/31/17  7:35 PM   Result Value Ref Range    Ethanol g/dL <0.01 <0.01 g/dL            Psychiatric Examination:     BP (!) 179/95  Pulse 89  Temp 98.1  F (36.7  C) (Oral)  Resp 18  Ht 1.753 m (5' 9\")  Wt 87.1 kg (192 lb)  SpO2 99%  BMI 28.35 kg/m2    Appearance:  awake, alert, adequately groomed and dressed in hospital scrubs, short, close-cropped silver hair  Attitude:  very cooperative, polite, expansive  Eye Contact:  intense  Mood:  appears anxious, but eager to help w/ interview process  Affect:  intensity is heightened and " reactive  Speech:  clear, coherent and normal prosody, very talkative  Psychomotor Behavior:  no evidence of tardive dyskinesia, dystonia, or tics and slight hand tremor observed  Thought Process:  goal oriented and tangential  Associations:  no loose associations  Thought Content:  no evidence of suicidal ideation or homicidal ideation and no evidence of psychotic thought  Insight:  fair  Judgment:  fair  Oriented to:  time, person, and place  Attention Span and Concentration:  limited  Recent and Remote Memory:  fair  Language:  english with appropriate syntax and vocabulary  Fund of Knowledge: appropriate  Muscle Strength and Tone: not assessed  Gait and Station: Normal         Physical Exam:     See ED assessment note by Dr. Cardona on 01/01/2018          Assessment     Edwige Ochoa is a 67 year old male with a history of major depression, anxiety, and alcohol use disorder who presented to the [facility] ED following several weeks of worsening depressive and anxiety symptoms in the context of significant alcohol usage. The patient's last psychiatric hospitalization was in 2012.  The patient is currently followed by an APRN named Zelda Hall. Family history is notable for severe depression. Current psychosocial stressors include job and financial stressors which patient has attempted to cope with by using alcohol. The patient denies drug abuse or self injurious behaviors. The MSE is notable for elevated affect, tangentiality, and slight upper extremity tremor. The patient's reported symptoms of worsening mood, increased difficulty w/ sleep, decreased energy, decreased concentration, and anhedonia are consistent with historic diagnosis of MDD. PTA medications were continued at time of admission, with the exception of flurazepam. Patient reports that he takes both clonazepam and flurazepam qHS for sleep, along with trazodone and mirtazapine. Patient had already received lorazepam in [facility] ED; I will  order clonazepam tonight but hold flurazepam to avoid excessive benzodiazepine dosage. Patient would likely benefit from reduction of sleep aids as he indicates that meds have not been very helpful with sleep. I will also order MSSA; patient's last drink was three days prior, but he does have a history of seizures and he is still in outer window for withdrawal seizures. Patient indicates a willingness to go to CD treatment.   Given severe nature of depression and alcohol use leaving patient unable to function, patient warrants inpatient psychiatric hospitalization to maintain him safety. Disposition pending clinical stabilization, medication optimization and development of an appropriate discharge plan.        Plan   Admit to Unit 20 with Attending Physician Dr. Herbert  Legal Status: Voluntary    Safety Assessment:   Behavioral Orders   Procedures     Code 1 - Restrict to Unit     Routine Programming     As clinically indicated     Status 15     Every 15 minutes.     Pt has not required locked seclusion or restraints in the past 24 hours to maintain safety, please refer to RN documentation for further details.    Medications:   Outpatient medications held:     Flurazepam 30 mg qHS (patient had already received lorazepam 1 mg in ED; will defer to assessment in AM)  Sildenafil PRN    Outpatient medications continued:    Sertraline 150 mg qday  Mirtazapine 30 mg qHS  Trazodone 300 mg qHS  Clonazepam 2 mg qHS (will order one time dose and allow for reassessment in AM)    New medications initiated:     Zyprexa 10 mg PO/IM PRN for agitation  Hydroxzyine PRN for anxiety  Valium PRN for MSSA      Principal psychiatric diagnosis: Major Depressive Disorder, recurrent    - CMP WNL except for slightly elevated LFTS, CBC WNL, utox positive for benzodiazepines  - Patient will be treated in therapeutic milieu with appropriate individual and group therapies.  - medications as above    Secondary psychiatric diagnoses:   #Alcohol  "Use Disorder  - Patient's last drink was 72 hrs ago; levels were undetectable. Patient does have a history of withdrawal seizures. Will order MSSA overnight with reevaluation in the AM.  - MSSA protocol  - Patient indicated a willingness to go to CD treatment    #JENNY  - as above    Medical diagnoses:    #Hypertension  - monitor for needs; patient has previously declined meds for hypertension    #Allergic Rhinitis  - Flonase spray daily    #GERD  - Prevacid daily    #Osteoarthritis  - glucosamine BID  - naproxen PRN  - ibuprofen PRN    #BPH  - Flomax daily    #Vitamin Supplementation  - Vitamin D daily  - Vitamin C daily  - Vitamin E daily  - B complex daily  - Omega fatty acid daily    Relevant psychosocial stressors: financial and job stressors     Dispo: unknown pending medication management and clinical stabilization    -------------------------------------------------------  Patient has been seen and evaluated by me, Georgi Harris MD, Psychiatry Resident, PGY- 2.    Attestation:   Physician Attestation   I, Jennifer Borjas, personally examined and evaluated this patient.  I discussed the patient with the nursing team, and agree with the assessment and plan of care as documented in the resident s note of 1-1-18.      I personally reviewed vital signs, medications and labs.    Key findings: Patient with history above; depression in the context of drinking but he denies being depressed other than   in this situation of stress with work and relapse.  The relapse seems to have occurred after he took this new position at [email or web address].  The stress relates to not being able to take breaks.  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  shelter was a very positive mood and his mood was good.  He does not see himself as depressed generally and he has many interests and enjoys many things he likes to \"kinsey.\"  This relapse progressed quite quickly to the point where he has been drinking " a half a liter every other day for the past few weeks he started to relapse with drinking about 8 ounces a day.    He sees his major mental health problem is anxiety.  He stated that his IQ of 164 is associated with his mind constantly going and the anxiety associated with his mind not shutting down.  Sleep has been a significant problem for him for quite some time.  He has been working with an APR and which has been a very positive experience about 6 months ago they added Klonopin for sleep and anxiety with the dose increased to 2 mg nightly this replaced Seroquel at 200 mg nightly.  He was on ropinirole because of the shakiness due to Seroquel but this was discontinued after the Seroquel was discontinued.  He sees the Klonopin as being very effective but he is aware that in combination with alcohol it is ill advised and he is not adverse to trying something else for sleep.  I shared with him that any treatment center for alcohol would likely not accept him being on Klonopin and also that it is ill advised to remain on it with a diagnosis of alcohol use disorder.  In terms of sleep he has been on trazodone for years and was on lorazepam for probably 15 years at a dose of 30 mg nightly as needed.  He took this several times a week and continues to take it in that way.  He was on Lunesta at one point but felt overmedicated but the dose is not known the intent of Remeron was 2 replays the Seroquel and potentially Klonopin.  He has never been on amitriptyline or other tricyclic antidepressants.  The patient thinks that Zoloft has caused insomnia.  He never had trouble with sleep before he was put on it.  However he thinks that this has been very effective in terms of other aspects of his mental health and specifically anxiety and depression.  Eventually he would like to decrease the Zoloft.  Recently with his stressful job.  Increased the dose of Zoloft 250 mg from 100 mg daily.    Mental status exam: The patient is  tired appearing.  He arrived in the middle of the night.  He is not wanting to be interviewed but is willing to answer some questions.  By the end of the interview he was appreciative for the opportunity to discuss his current problem.  Mood is somewhat anxious there is no tremor or signs of withdrawal.  His last use was 3 days ago and he had 1 day of withdrawal this is now resolved).  Speech is regular rate and rhythm remainder of physical exam as above no suicidal ideation and no psychotic symptoms    Agree with above diagnoses plus unspecified anxiety disorder    Recommendation 1.  Obtain further detail about what treatment options are for alcohol use disorder.  The patient has idea that he would like to go to a [facility] program because it is associated with teen challenge.  I am not familiar with this program but it is worth exploring.  Another alternative would be our [facility] intensive outpatient program if he is able to abstain from alcohol.  2.  Strongly suggest tapering course with Klonopin and replacing Klonopin with a different agent for sleep such as amitriptyline this taper will not completed likely by the time he has discharged.  3.  It appears that sertraline has been effective for mood and anxiety but he continues to have anxiety.  Other considerations might be paroxetine which would likely not interfere with sleep  4.  Discussed with the patient the alcohol pharmacotherapies and he is not interested in any of them at this time.              Jennifer Borjas  Date of Service (when I saw the patient): 01/01/18

## 2018-01-01 NOTE — ED NOTES
"Pt is getting very anxious and wants \"something to happen, here\".  Pt's family is @ his side. Pt's wife is wondering if she can go home or what the plan is. RN was made aware of this.  "

## 2018-01-01 NOTE — PROGRESS NOTES
01/01/18 0403   Patient Belongings   Did you bring any home meds/supplements to the hospital?  Yes   Disposition of meds  Sent to security/pharmacy per site process   Patient Belongings clothing;glasses;money (see comment);shoes;wallet;watch   Disposition of Belongings Kept with patient;Locker;Sent to security per site process   Belongings Search Yes   Clothing Search Yes   Second Staff MASON Snoi, [employer] [address] [id number]   Patient Locker:  ---Back Pack  ---Brown [place]  ---Small bag of change  ---Glasses x 2 with cases  ---Belt, Black leather  ---Coat, Black leather  Patient Room:  ---Appropriate Clothing  ---Slippers, Black  ---Shoes, Brown leather  Patient \ Person:  ---Watch, Metal, gold colored  Storage:  ---Suitcase, black in color, contains extra clothes, contraband items, books.  Security:  ---Valuables Envelope @ [id number],  Med envelopes to be added later.  Note:  ---No other money found in belongings other than small amount of change  A               Admission:  I am responsible for any personal items that are not sent to the safe or pharmacy.  [facility] is not responsible for loss, theft or damage of any property in my possession.    Signature:  _________________________________ Date: _______  Time: _____                                              Staff Signature:  ____________________________ Date: ________  Time: _____      2nd Staff person, if patient is unable/unwilling to sign:    Signature: ________________________________ Date: ________  Time: _____     Discharge:  [facility] has returned all of my personal belongings:    Signature: _________________________________ Date: ________  Time: _____                                          Staff Signature:  ____________________________ Date: ________  Time: _____     .Security:  ---Med Envelopes #'s [id number], [id number]

## 2018-01-01 NOTE — PROGRESS NOTES
01/01/18 1510   Behavioral Health   Hallucinations denies / not responding to hallucinations   Thinking intact   Orientation person: oriented;place: oriented;date: oriented   Memory baseline memory   Insight insight appropriate to situation   Judgement impaired   Eye Contact at examiner   Affect blunted, flat   Mood mood is calm   Physical Appearance/Attire attire appropriate to age and situation   Hygiene well groomed   Suicidality other (see comments)  (denies)   1. Wish to be Dead No   2. Non-Specific Active Suicidal Thoughts  No   Self Injury other (see comment)  (denies)   Elopement (nothing to report)   Activity isolative;withdrawn   Speech clear;coherent   Medication Sensitivity no observed side effects   Psychomotor / Gait balanced;steady   Psycho Education   Type of Intervention 1:1 intervention   Response participates, initiates socially appropriate   Hours 0.5   Treatment Detail (CHeck In and unit orientation)   Activities of Daily Living   Hygiene/Grooming independent   Oral Hygiene independent   Dress independent   Room Organization independent   Patient was in his room sleeping most of the morning.  The patient did come out for morning medication and both meals this shift.  The patient was doing daily hygiene tasks after lunch.  The patient denies an SI or SiB, stating that he is a  and that has never been a though of his due to his natasha.  The patient reports his mood to be about the same as as when he was admitted, but denies being depressed.  The patient just reports many life stressors leading to his admission.  The patient has several questions about the daily routine on the units and those were answered.  The patient was respectful and pleasant during interactions.

## 2018-01-01 NOTE — PROGRESS NOTES
Patient very tired and just wants to sleep.  No withdrawal at this time.  Just wants to sleep.  Was sleeping in underwear  Staff told patient he needed to have scubs on.

## 2018-01-01 NOTE — PROGRESS NOTES
Admit at 0122 via EMT stretcher from [facility]. Says he is here   because he wants to be free free from ETOH. Says has anxiety but no depression or SI. Knows that anxiety will soon lead to depression. Says his IQ is 478 and therefore his brain can't stop working and allow him to rest. Much of the information he gave during interview was in direct contradiction to information from previous H and P's . Pleasant,very talkative,circuitous conversation.  . Took meds. Appeared to sleep after 0500.

## 2018-01-02 PROCEDURE — 25000132 ZZH RX MED GY IP 250 OP 250 PS 637: Mod: GY | Performed by: PSYCHIATRY & NEUROLOGY

## 2018-01-02 PROCEDURE — H2032 ACTIVITY THERAPY, PER 15 MIN: HCPCS

## 2018-01-02 PROCEDURE — A9270 NON-COVERED ITEM OR SERVICE: HCPCS | Mod: GY | Performed by: STUDENT IN AN ORGANIZED HEALTH CARE EDUCATION/TRAINING PROGRAM

## 2018-01-02 PROCEDURE — 12400001 ZZH R&B MH UMMC

## 2018-01-02 PROCEDURE — 99233 SBSQ HOSP IP/OBS HIGH 50: CPT | Mod: GC | Performed by: PSYCHIATRY & NEUROLOGY

## 2018-01-02 PROCEDURE — 97150 GROUP THERAPEUTIC PROCEDURES: CPT | Mod: GO

## 2018-01-02 PROCEDURE — A9270 NON-COVERED ITEM OR SERVICE: HCPCS | Mod: GY | Performed by: PSYCHIATRY & NEUROLOGY

## 2018-01-02 PROCEDURE — 25000132 ZZH RX MED GY IP 250 OP 250 PS 637: Mod: GY | Performed by: STUDENT IN AN ORGANIZED HEALTH CARE EDUCATION/TRAINING PROGRAM

## 2018-01-02 RX ORDER — FLURAZEPAM HCL 15 MG
30 CAPSULE ORAL AT BEDTIME
Status: DISCONTINUED | OUTPATIENT
Start: 2018-01-02 | End: 2018-01-03

## 2018-01-02 RX ADMIN — Medication 400 UNITS: at 10:24

## 2018-01-02 RX ADMIN — SERTRALINE HYDROCHLORIDE 150 MG: 100 TABLET ORAL at 10:24

## 2018-01-02 RX ADMIN — Medication 1 CAPSULE: at 21:40

## 2018-01-02 RX ADMIN — B-COMPLEX W/ C & FOLIC ACID TAB 1 TABLET: TAB at 10:24

## 2018-01-02 RX ADMIN — Medication 1 CAPSULE: at 10:24

## 2018-01-02 RX ADMIN — MIRTAZAPINE 30 MG: 15 TABLET, FILM COATED ORAL at 21:40

## 2018-01-02 RX ADMIN — Medication 1 G: at 10:24

## 2018-01-02 RX ADMIN — OXYCODONE HYDROCHLORIDE AND ACETAMINOPHEN 1000 MG: 500 TABLET ORAL at 10:24

## 2018-01-02 RX ADMIN — TAMSULOSIN HYDROCHLORIDE 0.4 MG: 0.4 CAPSULE ORAL at 10:24

## 2018-01-02 RX ADMIN — Medication 1000 UNITS: at 10:24

## 2018-01-02 RX ADMIN — OXYCODONE HYDROCHLORIDE AND ACETAMINOPHEN 1000 MG: 500 TABLET ORAL at 21:40

## 2018-01-02 RX ADMIN — TRAZODONE HYDROCHLORIDE 300 MG: 100 TABLET ORAL at 21:40

## 2018-01-02 RX ADMIN — CLONAZEPAM 2 MG: 0.5 TABLET ORAL at 21:40

## 2018-01-02 RX ADMIN — PANTOPRAZOLE SODIUM 40 MG: 20 TABLET, DELAYED RELEASE ORAL at 10:23

## 2018-01-02 RX ADMIN — FLUTICASONE PROPIONATE 2 SPRAY: 50 SPRAY, METERED NASAL at 10:23

## 2018-01-02 ASSESSMENT — ACTIVITIES OF DAILY LIVING (ADL)
DRESS: SCRUBS (BEHAVIORAL HEALTH)
HYGIENE/GROOMING: INDEPENDENT
GROOMING: INDEPENDENT
ORAL_HYGIENE: INDEPENDENT
LAUNDRY: WITH SUPERVISION
ORAL_HYGIENE: INDEPENDENT
DRESS: INDEPENDENT
LAUNDRY: WITH SUPERVISION

## 2018-01-02 NOTE — PROGRESS NOTES
----------------------------------------------------------------------------------------------------------  [facility], [place]   Psychiatric Progress Note     Assessment    Presentation: Edwige Ochoa, a 67 year old male with history of MDD, JENNY, AUD, presented to [facility] ED with worsening anxiety and depression in the context of several weeks of progressive EtOH use and situational stressors related to finances and work.  Pt has had significant recent EtOH use and has a Hx of seizures.  Due to the severe nature of pt's depression and EtOH use leaving pt unable to function, pt warrants inpatient psychiatric hospitalization to maintain his safety.    Diagnostic Impression: Biological factors include Family Hx of severe depression, and dependence on multiple sleep medications.  Psychosocial stressors include job and financial stressors which pt has attempted to cope by self-medicating with EtOH, resulting in EtOH relapse and dependence, and uncontrolled depression leading to decreased function.  Pt also hid his drinking from his spouse who now demands that pt attend months of inpatient CD Tx.  Pt's anxiety and relative insomnia are consistent with a historic Dx of JENNY, and pt's depressive Sx are consistent with a historic Dx of Major Depressive Disorder, recurrent.  Pt's relapse and dependence on EtOH is also consistent with historic Dx of AUD.  Pt's admission was also significant for features of grandiosity and delusions of unique/superior brain and illogical thought processes, which appear to be improving.    Hospital course: Edwige Ochoa was admitted to station 20 as a voluntary patient with Dr. Herbert as attending physician.  Pt's PTA meds were restarted except flurazepam which was held d/t co-administration of another benzo.  Pt was placed on [facility] protocol for alcohol withdrawal.  Pt's flurazepam 30mg was restarted per PTA meds to avoid withdrawal.    Medical course:  none    Plan     Principal Diagnosis: Major Depressive d/o, recurrent    Secondary psychiatric diagnoses of concern this admission:  # JENNY  # Alcohol Use Disorder: on MSSA; undetectable EtOH levels upon admission, last drink 72h PTA; Hx w/d Szs;  Diazepam (per MSSA)  # Insomnia: (see continued meds below)    Medications:    New:   - olanzapine 10 mg PO/IM q2h PRN, agitation   - diazepam 5-20 mg [id number] PRN, for EtOH w/d as per MSSA protocol   - hydroxyzine 25-50 mg PO q4h prn, anxiety   Continue/Modify:   - clonazepam 2 mg PO QHS   - flurazepam 30 mg PO QHS   - mirtazapine 30 mg PO QHS   - trazodone 300 mg PO QHS   Hold: none  Laboratory/Imaging: none  Consults: none  Patient will be treated in therapeutic milieu with appropriate individual and group therapies as described.      Medical diagnoses to be addressed this admission:    #Hypertension  - monitor for needs; patient has previously declined meds for hypertension     #Allergic Rhinitis  - Flonase spray daily     #GERD  - Prevacid daily     #Osteoarthritis  - glucosamine BID  - naproxen PRN  - ibuprofen PRN     #BPH  - Flomax daily     #Vitamin Supplementation  - Vitamin D daily  - Vitamin C daily  - Vitamin E daily  - B complex daily  - Omega fatty acid daily    Relevant psychosocial stressors: financial, job stressors    Legal Status: Voluntary    Safety Assessment:   Checks: Status 15  Precautions: Suicide  Seizure  Substance Withdrawal  Pt has not required locked seclusion or restraints in the past 24 hours to maintain safety, please refer to RN documentation for further details.     The risks, benefits, alternatives and side effects have been discussed and are understood by the patient and other caregivers.    Anticipated Disposition/Discharge Date: TBD;  Pending patient stabilization, medication optimization, and arrangement of appropriate outpatient follow-up and services.      This documentation accurately reflects the services I personally performed  and treatment decisions made by me in consultation with the attending physician.    Frandy Esquivel MD, PGY-2 Psychiatry resident  Pager [phone]    Attestation:  I, Chauncey Herbert, saw and evaluated the patient with the resident physician.  I agree with the findings and plan of care as documented in the resident note.  I have reviewed all labs and vital signs.           Interim History:   The patient's care was discussed with the treatment team and chart notes were reviewed.     VSS  Sleep: 4.5, 7h  Scheduled meds: adherent  PRNs: atarax, ibuprofen, naproxen    Per staff notes, pt stated that his IQ is 478, therefore his brain can't stop working and allow him to rest.  Pt reported to give history that differed significantly than that documented in prior H&Ps.  Pt pleasant, talkative, circuitous speech.    Pt was interviewed in the conference room.  Pt states that he has experienced a few weeks of escalating anxiety and insomnia due to the stress of a new job, for which he has been self-medicating with escalating amounts of EtOH.  The pt only drank after work, never on the job.  He started drinking about 4 oz. In order to help fall asleep at night.  This escalated to about 1 liter per night.  Despite taking several sleeping meds for years, pt's tenuous sleep worsened, and he found himself waking in the middle of the night, and drinking EtOH in order to fall back asleep before his alarm clock went off.  Pt was eventually caught by his spouse, and was brought to the ED; pt's spouse is adamant that pt also pursue CD Tx as part of his treatment plan.  Pt has attended [facility] x2.    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  Pt knew the [employer] market and the staff at the [employer] store that hired him, so he assumed he would be a good fit.  But this store is a high-volume store with a rapid, chaotic sales environment.  Pt  "struggled to keep up, and he would forego optional breaks and lunch in order to continue working and try to keep up. This demanding environment has significantly contributed to pt's stress.    Pt also notes that his sleep has always been tenuous, and it has taken years of tailoring to reach pt's current sleep med regimen that includes 2 benzos , remeron and trazodone.  Pt states he believes his body got used to all these drugs in order to sleep.  Pt reports he will be very reluctant to changing these meds, although he is aware that some CD Tx facilities forbid taking benzos during Tx.  Pt states that he's tried substitutions during a previous CD Tx, \"but it left me groggy the next day.\"  Pt reports that he had a sleep study at  in the past but he found it somewhat unhelpful.  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  He feels fortunate for each new day, and is in some ways thankful that he's in this situation \"because God must want me to learn something.\"  Pt is interested in Shinto based CD Tx, but found that some facilities such as [facility] taught \"on too basic a level, whereas I'm way up here\" (holds hand up high.)  Pt is interested in [facility].  The team will contact [facility] to begin evaluation for possible enrollment upon d/c.    Pt had no further questions or concerns at this time.    Review of systems:     The Review of Systems is negative other than noted in the HPI         Medications:     Current Facility-Administered Medications   Medication     ascorbic acid (VITAMIN C) tablet 1,000 mg     vitamin B complex with vitamin C (STRESS TAB) tablet 1 tablet     cholecalciferol (vitamin D3) tablet 400 Units     fluticasone (FLONASE) 50 MCG/ACT spray 2 spray     glucosamine-chondroitin 500-400 MG per capsule 1 capsule     pantoprazole " "(PROTONIX) EC tablet 40 mg     mirtazapine (REMERON) tablet 30 mg     sertraline (ZOLOFT) tablet 150 mg     tamsulosin (FLOMAX) capsule 0.4 mg     traZODone (DESYREL) tablet 300 mg     vitamin E (TOCOPHEROL) capsule 1,000 Units     hydrOXYzine (ATARAX) tablet 25-50 mg     OLANZapine (zyPREXA) tablet 10 mg    Or     OLANZapine (zyPREXA) injection 10 mg     ibuprofen (ADVIL/MOTRIN) tablet 400 mg     fish oil-omega-3 fatty acids capsule 1 g     diazepam (VALIUM) tablet 5-20 mg     naproxen (NAPROSYN) tablet 250 mg     clonazePAM (klonoPIN) tablet 2 mg             Allergies:   No Known Allergies         Psychiatric Examination:   /81  Pulse 92  Temp 98.2  F (36.8  C) (Oral)  Resp 16  Ht 1.753 m (5' 9\")  Wt 85.7 kg (189 lb)  SpO2 99%  BMI 27.91 kg/m2  Weight is 189 lbs 0 oz  Body mass index is 27.91 kg/(m^2).    Appearance:  awake, alert, adequately groomed, dressed in hospital scrubs, appeared younger than stated age, cooperative, no apparent distress and normal weight  Attitude:  cooperative  Eye Contact:  good  Mood:  anxious, better and neutral  Affect:  appropriate and in normal range, mood congruent, intensity is normal, full range and reactive  Speech:  clear, coherent and normal prosody  Psychomotor Behavior:  no evidence of tardive dyskinesia, dystonia, or tics and intact station, gait and muscle tone  Thought Process:  logical, linear, goal oriented and circumstantial  Associations:  no loose associations  Thought Content:  no evidence of suicidal ideation or homicidal ideation, no evidence of psychotic thought, no auditory hallucinations present, no visual hallucinations present and some grandiosity  Insight:  fair  Judgment:  fair  Oriented to:  time, person, and place  Attention Span and Concentration:  intact  Recent and Remote Memory:  intact  Language: Fluent in conversational English   Fund of Knowledge: appropriate  Muscle Strength and Tone: normal  Gait and Station: Normal         Labs: "     - uTox: (+) for benzos  (pt has active Rxs for these)  - CBC: wnl  - CMP: wnl except AST 51 (H)  - EtOH: wnl ( < 0.01 )

## 2018-01-02 NOTE — PHARMACY-ADMISSION MEDICATION HISTORY
Admission Medication History status for the 1/1/2018 admission is complete.  See EPIC admission navigator for Prior to Admission medications.    Medication history sources:  Patient, [facility]     Medication history source reliability: Good    Medication adherence:  Moderate    Changes made to PTA medication list (reason)  Added: Senna (Pt reported taking), Flurazepam (Pt taking per [facility])  Deleted: None  Changed:   -Vitamin C take 1 capsule by mouth 2 times daily-> take 1 capsule by mouth 3 times daily  -Ibuprofen take 600mg by mouth as needed->Take 400mg by mouth 2 times daily    Additional medication history information (including reliability of information, actions taken by pharmacist):   -Per [facility], pt got flurazepam filled for a 30 day supply on 12/12/17  -Per pt, he is taking 4 30mg lansoprazole capsules daily    Time spent in this activity: 40 min    Medication history completed by: Rafy Larsen, Pharmacy Intern     Prior to Admission medications    Medication Sig Last Dose Taking? Auth Provider   glucosamine-chondroitin 500-400 MG CAPS per capsule Take 2 capsules by mouth 2 times daily Past Week at Unknown time Yes Unknown, Entered By History   traZODone HCl 300 MG TABS Take 300 mg by mouth At Bedtime Past Week at Unknown time Yes Unknown, Entered By History   sennosides (SENOKOT) 8.6 MG tablet Take 2 tablets by mouth daily Past Week at Unknown time Yes Unknown, Entered By History   tamsulosin (FLOMAX) 0.4 MG capsule TAKE 1 CAPSULE BY MOUTH DAILY Past Week at Unknown time Yes Frandy Page MD   naproxen (NAPROSYN) 500 MG tablet TAKE ONE TABLET BY MOUTH TWICE DAILY AS NEEDED FOR MODERATE PAIN Past Week at Unknown time Yes Frandy Page MD   fluticasone (FLONASE) 50 MCG/ACT spray SHAKE LIQUID AND USE 2 SPRAYS IN EACH NOSTRIL DAILY Past Week at Unknown time Yes Frandy Page MD   LANsoprazole (PREVACID) 30 MG capsule Take 1 capsule (30 mg) by mouth daily Take 30-60 minutes before a  meal.  Patient taking differently: Take 120 mg by mouth daily Take 30-60 minutes before a meal. Past Week at Unknown time Yes Frandy Page MD   Cholecalciferol (VITAMIN D3) 400 UNITS CAPS Take 1 capsule by mouth 2 times daily Past Week at Unknown time Yes Reported, Patient   ascorbic acid (VITAMIN C) 1000 MG TABS Take 1,000 mg by mouth 3 times daily  Past Week at Unknown time Yes Reported, Patient   vitamin E (TOCOPHEROL) 1000 UNIT capsule Take 1,000 Units by mouth daily Past Week at Unknown time Yes Reported, Patient   Echinacea CAPS Take 1,520 mg by mouth 2 times daily Past Week at Unknown time Yes Reported, Patient   Omega 3-6-9 Fatty Acids CAPS Take 1 capsule by mouth 2 times daily Past Week at Unknown time Yes Reported, Patient   B Complex TABS Take 1 tablet by mouth daily  Past Week at Unknown time Yes Reported, Patient   sertraline (ZOLOFT) 100 MG tablet Take 150 mg by mouth daily  Past Week at Unknown time Yes Reported, Patient   acetaminophen (TYLENOL) 500 MG tablet Take 1,000 mg by mouth as needed  Past Week at Unknown time Yes Reported, Patient   ibuprofen 200 MG capsule Take 400 mg by mouth 2 times daily  Past Week at Unknown time Yes Reported, Patient   mirtazapine (REMERON) 30 MG tablet Take 1 tablet by mouth At Bedtime. Past Week at Unknown time Yes Braxton Lam MD   flurazepam HCl 30 MG CAPS Take 30 mg by mouth At Bedtime Unknown at Unknown time  Unknown, Entered By History   VENTOLIN  (90 BASE) MCG/ACT Inhaler INHALE 2 PUFFS INTO THE LUNGS EVERY 6 HOURS AS NEEDED FOR SHORTNESS OF BREATH OR DIFFICULT BREATHING OR WHEEZING  Patient not taking: Reported on 12/21/2017   Frandy Page MD   sildenafil (VIAGRA) 100 MG tablet Take 0.5-1 tablets ( mg) by mouth daily as needed for erectile dysfunction Take 30 min to 4 hours before intercourse.  Never use with nitroglycerin, terazosin or doxazosin. More than a month at Unknown time  Frandy Page MD

## 2018-01-02 NOTE — ACP (ADVANCE CARE PLANNING)
Pt's nurse reports that this pt and a female peer (Ynae) requested to pray together.  Nurse told pts that this was ok.  Upon checking up on pts and their praying, nurse reports that Edwige was touching female pt (laying on hands) in prayer.  Nurse had to direct pt away from this behavior.

## 2018-01-02 NOTE — PROGRESS NOTES
01/02/18 1455   Behavioral Health   Hallucinations denies / not responding to hallucinations   Thinking poor concentration   Orientation person: oriented;place: oriented;date: oriented;time: oriented   Memory baseline memory   Insight poor   Judgement impaired   Eye Contact at examiner   Affect full range affect   Mood mood is calm   Physical Appearance/Attire attire appropriate to age and situation   Hygiene well groomed   Elopement (none shown)   Activity (visible in milieu)   Speech clear;coherent   Medication Sensitivity no stated side effects   Psychomotor / Gait balanced   Psycho Education   Type of Intervention 1:1 intervention   Response participates, initiates socially appropriate   Hours 0.5   Activities of Daily Living   Hygiene/Grooming independent   Oral Hygiene independent   Dress independent   Laundry with supervision   Room Organization independent   pt visible in milieu and attending some groups. Pt social with peers and staff. Pt full range affect and cooperative.

## 2018-01-02 NOTE — PROGRESS NOTES
"INITIAL OT ATTENDANCE:   Pt attended OT clinic group and declined offer to work on creative activities because he sees them as meaningless due to his \"I.Q of 174\".   Writer engaged pt in activity that involved visuospatial skills and problem solving; out of 4 attempts he did not complete task independently.   He said he was more interested in talking than doing anything since he is so smart that \"everything is too easy\".   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    Pt claimed that he \"used to race cars\".     Pt declined invitation to attend PM mindful yoga session.       Pt has been given a Self Assessment form, explained the purpose of including them in their treatment plan and offered options for meeting their needs. Pt identified reason for hospitalization (alcohol), minimal coping skills (talking to someone, exercise, setting limits, spirituality, hobbies),  supports outside the hospital (family) and personal strengths (\"my natasha in God\").      Pt verbalized understanding of symptoms on function. Pt identified experiencing the following symptoms, selected from checklist provided:     Feelings -   Anxiety      Thoughts -  No issues identified    Behaviors -  compulsive behaviors, increased substance use,      Pt identifies as need area:    Improve problem solving skills    In the past week, pt identified being    MODERATELY  BOTHERED due to lack of energy because of poor sleep and identified sleep satisfaction as   FAIR      Over the last 2 weeks, pt identified severity of insomnia as follows:     MODERATE   difficulty falling asleep   MILD   difficulty staying asleep   NO problems waking up too early     Pt considers sleep problems as  SOMEWHAT INTERFERING  with daily function.      OT PLAN:  Encourage ongoing attendance to support pt self-stated goals. Provide opportunities for " education, reinforcement and practice of positive coping skills, engage in graded opportunities for success, and provide ongoing assessment as needed.

## 2018-01-02 NOTE — PROGRESS NOTES
Pt s mood was calm and had full range of affect. He was reading his bible with another patient on the unit in the dining room. He requested to have a quiet place to pray. This writer made OT room available for him to pray. This writer was monitoring his activity in the OT room. This writer had witnessed pt put his hands on the other pt's shoulder. This writer intervened immediately. Informed pt regarding unit policy. No further incident. He denied having SI or SIB. He denied having ETOH withdrawal symptoms. His MSSA was 4 and 4. His fiancée was here to visit. He declined to go to AA meeting. He was somewhat intrusive. He liked to listen to other s conversation.

## 2018-01-02 NOTE — PLAN OF CARE
Problem: Patient Care Overview  Goal: Team Discussion  Team Plan:   BEHAVIORAL TEAM DISCUSSION    Participants:   Dr. Chauncey Herbert, Dr. Eagle, Dr. Esquivel, Audrey Duque MA., Anais Mcintosh RN    Progress:   Patient has scored low on MSSA. Patient reports anxiety sx's more than depression- denies SI. Sleep has been improving.    Continued Stay Criteria/Rationale:   Depression, anxiety and  Daily alcohol use    Medical/Physical:   HTN, Rginitis, GERD, Osteoarthritis, BPH  Precautions:   Behavioral Orders   Procedures     Code 1 - Restrict to Unit     Routine Programming     As clinically indicated     Status 15     Every 15 minutes.     Plan:   Patient will be monitored for closely for alcohol withdrawal and will have ongoing psychiatric assessment.  Medications will be reviewed and adjusted per MD as indicated. Outpatient providers/family will be contacted for care coordination.  Hospital staff will provide a safe environment and a therapeutic milieu. Patient will be encouraged to participate in unit groups and activities.     Patient is interested in CD treatment so will arrange a CD assessment for funding and placement. CTC will continue to assess needs and ensure appropriate follow up care is in place.       Rationale for change in precautions or plan:   No change in plan/precations at this time    Illness Management Recovery model: Personal Plan of Care    Patient will complete Personal Plan of Care, identifying reasons for hospitalization and goals for discharge. Form will be reviewed in team meeting and signed by patient, physician, writer and RN. Form will be given to [facility] to be scanned into EPIC.      I have reviewed and agree with the plan of care as written above.  Chauncey Herbert MD

## 2018-01-02 NOTE — PROGRESS NOTES
"SPIRITUAL HEALTH SERVICES  SPIRITUAL ASSESSMENT Progress Note  [facility] ([facility]) 20 NB     REFERRAL SOURCE: I visited pt Edwige this morning per [facility]  referral. Pt was in the group time by the time I get him. Pt said, \"I am much better now but when I am ready I will talk to you and let you know that.\" I share all the info about the [facility] for the pt.     PLAN: I will remain open to provide spiritual care for the pt as needed.    Mely Whitman M.Div. ([place]), M.Th., D.Min., [facility]  Staff   Pager [phone]    "

## 2018-01-03 PROCEDURE — A9270 NON-COVERED ITEM OR SERVICE: HCPCS | Mod: GY | Performed by: PSYCHIATRY & NEUROLOGY

## 2018-01-03 PROCEDURE — A9270 NON-COVERED ITEM OR SERVICE: HCPCS | Mod: GY | Performed by: STUDENT IN AN ORGANIZED HEALTH CARE EDUCATION/TRAINING PROGRAM

## 2018-01-03 PROCEDURE — 25000132 ZZH RX MED GY IP 250 OP 250 PS 637: Mod: GY | Performed by: STUDENT IN AN ORGANIZED HEALTH CARE EDUCATION/TRAINING PROGRAM

## 2018-01-03 PROCEDURE — 99232 SBSQ HOSP IP/OBS MODERATE 35: CPT | Mod: GC | Performed by: PSYCHIATRY & NEUROLOGY

## 2018-01-03 PROCEDURE — 12400001 ZZH R&B MH UMMC

## 2018-01-03 PROCEDURE — 90853 GROUP PSYCHOTHERAPY: CPT

## 2018-01-03 PROCEDURE — 25000132 ZZH RX MED GY IP 250 OP 250 PS 637: Mod: GY | Performed by: PSYCHIATRY & NEUROLOGY

## 2018-01-03 RX ORDER — ALBUTEROL SULFATE 90 UG/1
1-2 AEROSOL, METERED RESPIRATORY (INHALATION) EVERY 6 HOURS PRN
Status: DISCONTINUED | OUTPATIENT
Start: 2018-01-03 | End: 2018-01-05 | Stop reason: HOSPADM

## 2018-01-03 RX ADMIN — OXYCODONE HYDROCHLORIDE AND ACETAMINOPHEN 1000 MG: 500 TABLET ORAL at 22:30

## 2018-01-03 RX ADMIN — Medication 400 UNITS: at 09:01

## 2018-01-03 RX ADMIN — TAMSULOSIN HYDROCHLORIDE 0.4 MG: 0.4 CAPSULE ORAL at 09:03

## 2018-01-03 RX ADMIN — SERTRALINE HYDROCHLORIDE 150 MG: 100 TABLET ORAL at 09:01

## 2018-01-03 RX ADMIN — PANTOPRAZOLE SODIUM 40 MG: 20 TABLET, DELAYED RELEASE ORAL at 09:01

## 2018-01-03 RX ADMIN — ALBUTEROL SULFATE 2 PUFF: 90 AEROSOL, METERED RESPIRATORY (INHALATION) at 19:53

## 2018-01-03 RX ADMIN — OXYCODONE HYDROCHLORIDE AND ACETAMINOPHEN 1000 MG: 500 TABLET ORAL at 09:01

## 2018-01-03 RX ADMIN — TRAZODONE HYDROCHLORIDE 300 MG: 100 TABLET ORAL at 22:30

## 2018-01-03 RX ADMIN — Medication 1 CAPSULE: at 22:29

## 2018-01-03 RX ADMIN — CLONAZEPAM 2 MG: 0.5 TABLET ORAL at 22:30

## 2018-01-03 RX ADMIN — FLUTICASONE PROPIONATE 2 SPRAY: 50 SPRAY, METERED NASAL at 09:03

## 2018-01-03 RX ADMIN — Medication 1 G: at 09:02

## 2018-01-03 RX ADMIN — Medication 1000 UNITS: at 09:02

## 2018-01-03 RX ADMIN — MIRTAZAPINE 30 MG: 15 TABLET, FILM COATED ORAL at 22:30

## 2018-01-03 RX ADMIN — B-COMPLEX W/ C & FOLIC ACID TAB 1 TABLET: TAB at 09:01

## 2018-01-03 RX ADMIN — Medication 1 CAPSULE: at 09:01

## 2018-01-03 ASSESSMENT — ACTIVITIES OF DAILY LIVING (ADL)
ORAL_HYGIENE: INDEPENDENT
ORAL_HYGIENE: INDEPENDENT
DRESS: SCRUBS (BEHAVIORAL HEALTH)
HYGIENE/GROOMING: INDEPENDENT
LAUNDRY: UNABLE TO COMPLETE
GROOMING: INDEPENDENT
DRESS: SCRUBS (BEHAVIORAL HEALTH)

## 2018-01-03 NOTE — PROGRESS NOTES
Pt s mood was calm and had full range of affect. Pt told staff that he had a good mood this afternoon. 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 He admitted having alcohol problem. He tried to  keep busy . He was observed peeking into other patient s room. He was informed that it is inappropriate. He refused h is [place] this evening stating 'I don't want to be over medicated.

## 2018-01-03 NOTE — PROGRESS NOTES
01/03/18 1700   General Information   Date Initially Attended OT 01/02/18   Clinical Impression   Affect Appropriate to situation;Excessive, manic   Orientation Oriented to person, place and time   Appearance and ADLs General cleanliness observed in most areas   Attention to Internal Stimuli No observed signs   Interaction Skills Interacts appropriately with staff;Initiates appropriately with staff;Interacts appropriately with peers;Initiates appropriately with peers   Ability to Communicate Needs Independent   Verbal Content Articulate;Appropriate to topic;Selective   Ability to Maintain Boundaries Maintains appropriate physical boundaries;Maintains appropriate verbal boundaries;Accepts and maintains boundaries with one cue   Participation Initiates participation;Independently participates   Concentration Concentrates 20-30 minutes;Needs further assessment   Ability to Concentrate Needs further assessment   Follows and Comprehends Directions Independently follows 2 step verbal directions;Needs further assessment   Memory Delayed and immediate recall intact   Organization Independently organizes simple tasks   Decision Making Independent   Planning and Problem Solving Occasionally needs assist/feedback   Ability to Apply and Learn Concepts Applies within group structure   Frustrations / Stress Tolerance Needs further assessment   Level of Insight Needs further assessment   Self Esteem Can identify positives;Grandiose;Needs further assessment   Social Supports Needs further assessment       Attended 1.0  Of 3 groups today.   Needed some verbal encouragement and reinforcement of boundaries of group structure; he initially voice difficulty but then seemed to work through the situation and voice satisfaction at the results of his efforts.   Participated in OT creative task activity group focused on identification of activities to support mental, physical, spiritual and emotional self-care for the management of  symptoms.  Pt selected images from magazines to contribute to a group collage and verbalized rationale for images chosen through group discussion and presentation.

## 2018-01-03 NOTE — PROGRESS NOTES
01/03/18 1353   Behavioral Health   Hallucinations denies / not responding to hallucinations   Thinking intact   Orientation person: oriented;place: oriented;date: oriented;time: oriented   Memory baseline memory   Insight admits / accepts   Judgement intact   Eye Contact at examiner   Affect full range affect   Mood mood is calm   Physical Appearance/Attire attire appropriate to age and situation   Hygiene well groomed   Suicidality (denies)   Self Injury (denies)   Elopement (none observed)   Speech clear;coherent   Psychomotor / Gait balanced;steady   Psycho Education   Type of Intervention 1:1 intervention   Response participates, initiates socially appropriate   Hours 0.5   Treatment Detail (check in)   Activities of Daily Living   Hygiene/Grooming independent   Oral Hygiene independent   Dress scrubs (behavioral health)   Laundry unable to complete   Room Organization independent     Patient had a good shift and was very cooperative. Denied having feelings of depression or anxiety. Patient is has been active in groups and visible in the milieu.

## 2018-01-03 NOTE — PROGRESS NOTES
Patient reports good mood, denies SI/SIB. Patient states he finds it difficult to be on the unit with so little activity but otherwise feeling improved over the past few days. Patient states his problems were mostly due to alcoholism and now that he is sober he is sleeping much better and hopeful about the future. Patient was visible and social with full range affect throughout the shift.     01/02/18 2100   Behavioral Health   Hallucinations denies / not responding to hallucinations   Thinking intact   Orientation person: oriented;place: oriented;date: oriented;time: oriented   Memory baseline memory   Insight admits / accepts   Judgement impaired   Eye Contact at examiner   Affect full range affect   Mood mood is calm   Physical Appearance/Attire appears stated age;attire appropriate to age and situation   Hygiene well groomed   Suicidality other (see comments)  (Denies)   1. Wish to be Dead No   2. Non-Specific Active Suicidal Thoughts  No   Self Injury other (see comment)  (Denies)   Activity other (see comment)  (Visible and social.)   Speech clear;coherent   Medication Sensitivity no stated side effects;no observed side effects   Psychomotor / Gait balanced;steady   Activities of Daily Living   Hygiene/Grooming independent   Oral Hygiene independent   Dress scrubs (behavioral health)   Laundry with supervision   Room Organization independent

## 2018-01-03 NOTE — PROGRESS NOTES
----------------------------------------------------------------------------------------------------------  [facility], [place]   Psychiatric Progress Note    Contacts:       Janet Pham (pt's significant other), [phone]; USMAN signed    Outpatient psychiatrist (pending)     Assessment      Presentation: Edwige Ochoa, a 67 year old male with history of MDD, JENNY, AUD, presented to [facility] ED with worsening anxiety and depression in the context of several weeks of progressive EtOH use and situational stressors related to finances and work.  Pt has had significant recent EtOH use and has a Hx of seizures.  Due to the severe nature of pt's depression and EtOH use leaving pt unable to function, pt warrants inpatient psychiatric hospitalization to maintain his safety.    Diagnostic Impression: Biological factors include Family Hx of severe depression, and dependence on multiple sleep medications.  Psychosocial stressors include job and financial stressors which pt has attempted to cope by self-medicating with EtOH, resulting in EtOH relapse and dependence, and uncontrolled depression leading to decreased function.  Pt also hid his drinking from his spouse who now demands that pt attend months of inpatient CD Tx.  Pt's anxiety and relative insomnia are consistent with a historic Dx of JENNY, and pt's depressive Sx are consistent with a historic Dx of Major Depressive Disorder, recurrent.  Pt's relapse and dependence on EtOH is also consistent with historic Dx of AUD.  Pt continues to express features of grandiosity and overvalued ideas of unique/superior brain and abilities, suggestive of narcissistic traits.    Hospital course: Edwige Ochoa was admitted to station 20 as a voluntary patient with Dr. Herbert as attending physician.  Pt's PTA meds were restarted except flurazepam which was held d/t co-administration of another benzo.  Pt was placed on [facility] protocol for alcohol  withdrawal.  Pt's flurazepam 30mg was restarted per PTA meds to avoid withdrawal, but was declined by pt in favor of continued clonazepam, so flurazepam was d/c'ed.    Medical course: none    Plan     Principal Diagnosis: Major Depressive d/o, recurrent    Secondary psychiatric diagnoses of concern this admission:  # JENNY  # Alcohol Use Disorder: on MSSA; undetectable EtOH levels upon admission, last drink 72h PTA; Hx w/d Szs;  Diazepam (per MSSA)  # Insomnia: (see continued meds below)    Medications:    New:   - olanzapine 10 mg PO/IM q2h PRN, agitation   - diazepam 5-20 mg [id number] PRN, for EtOH w/d as per MSSA protocol   - hydroxyzine 25-50 mg PO q4h prn, anxiety   Continue/Modify:   - clonazepam 2 mg PO QHS    (d/c'ed due to pt preference for clonazepam)   - mirtazapine 30 mg PO QHS   - trazodone 300 mg PO QHS   Hold: none  Laboratory/Imaging: none  Consults: none  Patient will be treated in therapeutic milieu with appropriate individual and group therapies as described.      Medical diagnoses to be addressed this admission:    #Hypertension  - monitor for needs; patient has previously declined meds for hypertension     #Allergic Rhinitis  - Flonase spray daily     #GERD  - Prevacid daily     #Osteoarthritis  - glucosamine BID  - naproxen PRN  - ibuprofen PRN     #BPH  - Flomax daily     #Vitamin Supplementation  - Vitamin D daily  - Vitamin C daily  - Vitamin E daily  - B complex daily  - Omega fatty acid daily    Relevant psychosocial stressors: financial, job stressors    Legal Status: Voluntary    Safety Assessment:   Checks: Status 15  Precautions: Suicide  Seizure  Substance Withdrawal  Pt has not required locked seclusion or restraints in the past 24 hours to maintain safety, please refer to RN documentation for further details.     The risks, benefits, alternatives and side effects have been discussed and are understood by the patient and other caregivers.    Anticipated Disposition/Discharge Date: TBD;   "Pending patient stabilization, medication optimization, and arrangement of appropriate outpatient follow-up and services.      This documentation accurately reflects the services I personally performed and treatment decisions made by me in consultation with the attending physician.    Frandy Esquivel MD, PGY-2 Psychiatry resident  Pager [phone]    Attestation:  I, Chaunceyfuad Herbert, saw and evaluated the patient with the resident physician.  I agree with the findings and plan of care as documented in the resident note.  I have reviewed all labs and vital signs.       Interim History:   The patient's care was discussed with the treatment team and chart notes were reviewed.     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  Pt was visible in milieu, social, full range, cooperative.  Denied SI/SIB.  States he problems were mostly d/t EtOH, is now sleeping much better, is hopeful.  Pt was observed peeking in other pts' rooms, needed redirection.  Pt refused his scheduled flurazepam, \"I don't want to be over-medicated.\"    Pt was interviewed in the conference room.  Pt reports that his mood is \"good\", he is \"fortunate\" to be learning this lesson of life.  Pt acknowledges that he completed his Rule 25 assessment application last night.  Team clarifies that the after the paper application is complete, pt is placed in a queue for R.25 assessors to interview him to determine his level of need and what programs would be appropriate fits.  Due to a backlog at , it may take a few days.  Alternately, pt may find " greater expediency by performing the interview at an external location after d/c.  Pt expresses interest in [facility] for outpatient Tx, may pursue assessment there.    Team offered medications to curb drinking urge (eg. Naltrexone), or deterrent meds (eg. Disulfiram.)  Pt states that he has no actual cravings, he was using EtOH strictly for functional purposes, and will not return to drinking.  Pt echoes that natasha-based treatment is important to him, reports that he derives strength from Bible study and his natasha.  Pt also notes that his fiancee is still curiously rigid regarding attending 2 months of inpatient CD Tx.    This writer attempted to contact Janet (pt's S.O.) but was unsuccessful.  Pt reports that she is planning to visit tonight.  Pt will sign USMAN for pt's outpatient psychiatrist.    Pt had no further questions or concerns at this time.    Review of systems:     The Review of Systems is negative other than noted in the HPI         Medications:     Current Facility-Administered Medications   Medication     flurazepam (DALMANE) capsule 30 mg     ascorbic acid (VITAMIN C) tablet 1,000 mg     vitamin B complex with vitamin C (STRESS TAB) tablet 1 tablet     cholecalciferol (vitamin D3) tablet 400 Units     fluticasone (FLONASE) 50 MCG/ACT spray 2 spray     glucosamine-chondroitin 500-400 MG per capsule 1 capsule     pantoprazole (PROTONIX) EC tablet 40 mg     mirtazapine (REMERON) tablet 30 mg     sertraline (ZOLOFT) tablet 150 mg     tamsulosin (FLOMAX) capsule 0.4 mg     traZODone (DESYREL) tablet 300 mg     vitamin E (TOCOPHEROL) capsule 1,000 Units     hydrOXYzine (ATARAX) tablet 25-50 mg     OLANZapine (zyPREXA) tablet 10 mg    Or     OLANZapine (zyPREXA) injection 10 mg     ibuprofen (ADVIL/MOTRIN) tablet 400 mg     fish oil-omega-3 fatty acids capsule 1 g     diazepam (VALIUM) tablet 5-20 mg     naproxen (NAPROSYN) tablet 250 mg     clonazePAM (klonoPIN) tablet 2 mg             Allergies:   No  "Known Allergies         Psychiatric Examination:   /83  Pulse 95  Temp 97.2  F (36.2  C) (Oral)  Resp 16  Ht 1.753 m (5' 9\")  Wt 85.7 kg (189 lb)  SpO2 99%  BMI 27.91 kg/m2  Weight is 189 lbs 0 oz  Body mass index is 27.91 kg/(m^2).    Appearance:  awake, alert, adequately groomed, dressed in hospital scrubs, appeared younger than stated age, cooperative, no apparent distress and normal weight  Attitude:  cooperative  Eye Contact:  good  Mood:  better and neutral  Affect:  appropriate and in normal range, mood congruent, intensity is normal, full range and reactive  Speech:  clear, coherent and normal prosody  Psychomotor Behavior:  no evidence of tardive dyskinesia, dystonia, or tics and intact station, gait and muscle tone  Thought Process:  logical, linear, goal oriented and circumstantial  Associations:  no loose associations  Thought Content:  no evidence of suicidal ideation or homicidal ideation, no evidence of psychotic thought, no auditory hallucinations present, no visual hallucinations present and some grandiosity  Insight:  fair  Judgment:  fair  Oriented to:  time, person, and place  Attention Span and Concentration:  intact  Recent and Remote Memory:  intact  Language: Fluent in conversational English   Fund of Knowledge: appropriate  Muscle Strength and Tone: normal  Gait and Station: Normal         Labs:     - uTox: (+) for benzos  (pt has active Rxs for these)  - CBC: wnl  - CMP: wnl except AST 51 (H)  - EtOH: wnl ( < 0.01 )    "

## 2018-01-04 PROCEDURE — A9270 NON-COVERED ITEM OR SERVICE: HCPCS | Mod: GY | Performed by: STUDENT IN AN ORGANIZED HEALTH CARE EDUCATION/TRAINING PROGRAM

## 2018-01-04 PROCEDURE — 25000132 ZZH RX MED GY IP 250 OP 250 PS 637: Mod: GY | Performed by: STUDENT IN AN ORGANIZED HEALTH CARE EDUCATION/TRAINING PROGRAM

## 2018-01-04 PROCEDURE — A9270 NON-COVERED ITEM OR SERVICE: HCPCS | Mod: GY | Performed by: PSYCHIATRY & NEUROLOGY

## 2018-01-04 PROCEDURE — 99232 SBSQ HOSP IP/OBS MODERATE 35: CPT | Mod: GC | Performed by: PSYCHIATRY & NEUROLOGY

## 2018-01-04 PROCEDURE — 25000132 ZZH RX MED GY IP 250 OP 250 PS 637: Mod: GY | Performed by: PSYCHIATRY & NEUROLOGY

## 2018-01-04 PROCEDURE — 12400001 ZZH R&B MH UMMC

## 2018-01-04 PROCEDURE — 90853 GROUP PSYCHOTHERAPY: CPT

## 2018-01-04 RX ORDER — LANSOPRAZOLE 30 MG/1
120 CAPSULE, DELAYED RELEASE ORAL DAILY
Qty: 120 CAPSULE | Refills: 0 | Status: SHIPPED | OUTPATIENT
Start: 2018-01-04 | End: 2018-02-03

## 2018-01-04 RX ORDER — ALBUTEROL SULFATE 90 UG/1
1-2 AEROSOL, METERED RESPIRATORY (INHALATION) EVERY 6 HOURS PRN
Qty: 1 INHALER | Refills: 0 | Status: SHIPPED | OUTPATIENT
Start: 2018-01-04

## 2018-01-04 RX ORDER — CLONAZEPAM 2 MG/1
2 TABLET ORAL AT BEDTIME
Qty: 30 TABLET | Refills: 0 | Status: SHIPPED | OUTPATIENT
Start: 2018-01-04 | End: 2018-02-03

## 2018-01-04 RX ORDER — CHLORAL HYDRATE 500 MG
1 CAPSULE ORAL DAILY
Qty: 30 CAPSULE | Refills: 0 | Status: SHIPPED | OUTPATIENT
Start: 2018-01-04 | End: 2018-02-03

## 2018-01-04 RX ORDER — HYDROXYZINE HYDROCHLORIDE 25 MG/1
25-50 TABLET, FILM COATED ORAL EVERY 4 HOURS PRN
Qty: 120 TABLET | Refills: 0 | Status: SHIPPED | OUTPATIENT
Start: 2018-01-04

## 2018-01-04 RX ORDER — NAPROXEN 250 MG/1
250 TABLET ORAL 2 TIMES DAILY PRN
Qty: 60 TABLET | Refills: 0 | Status: SHIPPED | OUTPATIENT
Start: 2018-01-04 | End: 2018-02-03

## 2018-01-04 RX ORDER — MULTIVIT WITH MINERALS/LUTEIN
1000 TABLET ORAL DAILY
Qty: 30 CAPSULE | Refills: 0 | Status: SHIPPED | OUTPATIENT
Start: 2018-01-04 | End: 2018-02-03

## 2018-01-04 RX ORDER — BENZONATATE 100 MG/1
100 CAPSULE ORAL 3 TIMES DAILY PRN
Status: DISCONTINUED | OUTPATIENT
Start: 2018-01-04 | End: 2018-01-05 | Stop reason: HOSPADM

## 2018-01-04 RX ORDER — IBUPROFEN 400 MG/1
400 TABLET, FILM COATED ORAL EVERY 6 HOURS PRN
Qty: 100 TABLET | Refills: 0 | Status: SHIPPED | OUTPATIENT
Start: 2018-01-04

## 2018-01-04 RX ORDER — ACETAMINOPHEN 500 MG
500-1000 TABLET ORAL EVERY 6 HOURS PRN
Status: DISCONTINUED | OUTPATIENT
Start: 2018-01-04 | End: 2018-01-05 | Stop reason: HOSPADM

## 2018-01-04 RX ORDER — MIRTAZAPINE 30 MG/1
30 TABLET, FILM COATED ORAL AT BEDTIME
Qty: 30 TABLET | Refills: 0 | Status: SHIPPED | OUTPATIENT
Start: 2018-01-04 | End: 2018-02-03

## 2018-01-04 RX ORDER — TAMSULOSIN HYDROCHLORIDE 0.4 MG/1
0.4 CAPSULE ORAL DAILY
Qty: 30 CAPSULE | Refills: 0 | Status: SHIPPED | OUTPATIENT
Start: 2018-01-04 | End: 2018-02-03

## 2018-01-04 RX ORDER — FLUTICASONE PROPIONATE 50 MCG
2 SPRAY, SUSPENSION (ML) NASAL DAILY
Qty: 1 BOTTLE | Refills: 0 | Status: SHIPPED | OUTPATIENT
Start: 2018-01-04

## 2018-01-04 RX ORDER — SODIUM PHOSPHATE,MONO-DIBASIC 19G-7G/118
1 ENEMA (ML) RECTAL 2 TIMES DAILY
Qty: 60 CAPSULE | Refills: 0 | Status: SHIPPED | OUTPATIENT
Start: 2018-01-04 | End: 2018-02-03

## 2018-01-04 RX ORDER — TRAZODONE HYDROCHLORIDE 300 MG/1
300 TABLET ORAL AT BEDTIME
Qty: 30 TABLET | Refills: 0 | Status: SHIPPED | OUTPATIENT
Start: 2018-01-04 | End: 2018-02-03

## 2018-01-04 RX ADMIN — OXYCODONE HYDROCHLORIDE AND ACETAMINOPHEN 1000 MG: 500 TABLET ORAL at 22:05

## 2018-01-04 RX ADMIN — Medication 1000 UNITS: at 09:34

## 2018-01-04 RX ADMIN — Medication 1 G: at 09:34

## 2018-01-04 RX ADMIN — BENZONATATE 100 MG: 100 CAPSULE ORAL at 11:41

## 2018-01-04 RX ADMIN — B-COMPLEX W/ C & FOLIC ACID TAB 1 TABLET: TAB at 09:33

## 2018-01-04 RX ADMIN — FLUTICASONE PROPIONATE 2 SPRAY: 50 SPRAY, METERED NASAL at 09:36

## 2018-01-04 RX ADMIN — TRAZODONE HYDROCHLORIDE 300 MG: 100 TABLET ORAL at 22:06

## 2018-01-04 RX ADMIN — OXYCODONE HYDROCHLORIDE AND ACETAMINOPHEN 1000 MG: 500 TABLET ORAL at 09:32

## 2018-01-04 RX ADMIN — Medication 400 UNITS: at 09:32

## 2018-01-04 RX ADMIN — ACETAMINOPHEN 1000 MG: 500 TABLET ORAL at 10:39

## 2018-01-04 RX ADMIN — Medication 1 CAPSULE: at 09:33

## 2018-01-04 RX ADMIN — TAMSULOSIN HYDROCHLORIDE 0.4 MG: 0.4 CAPSULE ORAL at 09:34

## 2018-01-04 RX ADMIN — Medication 1 CAPSULE: at 22:05

## 2018-01-04 RX ADMIN — MIRTAZAPINE 30 MG: 15 TABLET, FILM COATED ORAL at 22:06

## 2018-01-04 RX ADMIN — ALBUTEROL SULFATE 2 PUFF: 90 AEROSOL, METERED RESPIRATORY (INHALATION) at 16:58

## 2018-01-04 RX ADMIN — SERTRALINE HYDROCHLORIDE 150 MG: 100 TABLET ORAL at 09:33

## 2018-01-04 RX ADMIN — PANTOPRAZOLE SODIUM 40 MG: 20 TABLET, DELAYED RELEASE ORAL at 09:33

## 2018-01-04 RX ADMIN — CLONAZEPAM 2 MG: 0.5 TABLET ORAL at 22:05

## 2018-01-04 NOTE — PROGRESS NOTES
Behavioral Health  Note   Behavioral Health  Spirituality Group Note     Unit 20    Name: Edwige Ochoa    YOB: 1950   MRN: [id number]    Age: 67 year old     Patient attended -led group, which included discussion of spirituality, coping with illness and building resilience.   Patient attended group for 1.0 hrs.   patient demonstrated an appreciation of topic's application for their personal circumstances.     Mely [facility]  Staff    Page [phone]

## 2018-01-04 NOTE — PROGRESS NOTES
----------------------------------------------------------------------------------------------------------  [facility], [place]   Psychiatric Progress Note    Contacts:       Janet Pham (pt's significant other), [phone]; USMAN signed    Zelda Casas (pt's Outpatient psychiatrist), [phone]; USMAN signed     Assessment      Presentation: Edwige Ochoa, a 67 year old male with history of MDD, JENNY, AUD, presented to [facility] ED with worsening anxiety and depression in the context of several weeks of progressive EtOH use and situational stressors related to finances and work.  Pt has had significant recent EtOH use and has a Hx of seizures.  Due to the severe nature of pt's depression and EtOH use leaving pt unable to function, pt warrants inpatient psychiatric hospitalization to maintain his safety.    Diagnostic Impression: Biological factors include Family Hx of severe depression, and dependence on multiple sleep medications.  Psychosocial stressors include job and financial stressors which pt has attempted to cope by self-medicating with EtOH, resulting in EtOH relapse and dependence, and uncontrolled depression leading to decreased function.  Pt also hid his drinking from his spouse who now demands that pt attend months of inpatient CD Tx.  Pt's anxiety and relative insomnia are consistent with a historic Dx of JENNY, and pt's depressive Sx are consistent with a historic Dx of Major Depressive Disorder, recurrent.  Pt's relapse and dependence on EtOH is also consistent with historic Dx of AUD.  Pt continues to express features of grandiosity and overvalued ideas of unique/superior brain / IQ / abilities, suggestive of narcissistic traits.    Hospital course: Edwige Ochoa was admitted to station 20 as a voluntary patient with Dr. Herbert as attending physician.  Pt's PTA meds were restarted except flurazepam which was held d/t co-administration of another benzo.  Pt was  placed on MSSA protocol for alcohol withdrawal.  Pt's flurazepam 30mg was restarted per PTA meds to avoid withdrawal, but was declined by pt in favor of continued clonazepam, so flurazepam was d/c'ed.  Pt had a Rule 25 evaluation and was referred to multiple facilities, will complete phone screening to determine placement.    Medical course: Pt endorsed respiratory infection with cough and myalgias, received Tessalon pearls and Tylenol PRN.    Plan     Principal Diagnosis: Major Depressive d/o, recurrent    Secondary psychiatric diagnoses of concern this admission:  # JENNY  # Alcohol Use Disorder: discontinued MSSA and diazepam, as pt had not scored enough to merit benzo Tx.  # Insomnia: (see continued meds below)    Medications:    New:   - olanzapine 10 mg PO/IM q2h PRN, agitation      - hydroxyzine 25-50 mg PO q4h prn, anxiety   Continue/Modify:   - clonazepam 2 mg PO QHS    (d/c'ed due to pt preference for clonazepam)   - mirtazapine 30 mg PO QHS   - trazodone 300 mg PO QHS   Hold: none  Laboratory/Imaging: none  Consults: none  Patient will be treated in therapeutic milieu with appropriate individual and group therapies as described.      Medical diagnoses to be addressed this admission:    #Hypertension  - monitor for needs; patient has previously declined meds for hypertension     #Allergic Rhinitis  - Flonase spray daily     #GERD  - Prevacid daily     #Osteoarthritis  - glucosamine BID  - naproxen PRN  - ibuprofen PRN     #BPH  - Flomax daily     #Vitamin Supplementation  - Vitamin D daily  - Vitamin C daily  - Vitamin E daily  - B complex daily  - Omega fatty acid daily    # Respiratory illness: with cough and arthralgia  - Tessalon pearls  - Tylenol 500-1000 mg PO q6h PRN, pain and fever    Relevant psychosocial stressors: financial, job stressors    Legal Status: Voluntary    Safety Assessment:   Checks: Status 15  Precautions: Suicide  Sexual  Seizure  Substance Withdrawal    Pt has not required locked  "seclusion or restraints in the past 24 hours to maintain safety, please refer to RN documentation for further details.     The risks, benefits, alternatives and side effects have been discussed and are understood by the patient and other caregivers.    Anticipated Disposition/Discharge Date: TBD;  Pending patient stabilization, medication optimization, and arrangement of appropriate outpatient follow-up and services.      This documentation accurately reflects the services I personally performed and treatment decisions made by me in consultation with the attending physician.    Frandy Esquivel MD, PGY-2 Psychiatry resident  Pager [phone]    Attestation: I, Chauncey Herbert, saw and evaluated the patient with the resident physician.  I agree with the findings and plan of care as documented in the resident note.  I have reviewed all labs and vital signs.       Interim History:   The patient's care was discussed with the treatment team and chart notes were reviewed.     VSS; MSSA 3  Sleep: (not documented)  Scheduled meds: none  PRNs: albuterol     Per staff notes, pt attended 1 OT group, worked on a group collage, participated in discussion.  Pt was visible on milieu, social w/ peers, pacing halls.  Pt reported feeling hopeless @ home because of home issues, can't fix while in hospital, stated  \"She feels agitated.\"    Pt was interviewed in his room.  Pt remained in bed, stated he felt \"terrible\" and believed he had the flu, endorsed cough, shoulder pain, mattress wasn't soft.  Pt asked for cough med and tylenol.    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  However, on one car the transmission , on another the battery , and the third is in storage, so he doesn't have a working car at this time.    Pt continues to express interest in CD Tx.  Team states that Rule 25 eval may occur faster in the outpatient setting, eg. At [facility], than if he stayed " "at [email or web address]  Pt expresses interest in attending \"Celebrate Recovery\" at the large [facility], although there are likely other CR groups in the area.   Pt states that CR is more intensive, has 1 hour of group meeting followed by 1 hour of small-group breakout, on a weekly basis.  He states that CR is Rastafarian (Bible-based), more interesting, vs. \"old veterans with boring war stories.\"  Pt had no further questions or concerns at this time.    Later this morning, this writer contacted pt's geovannaancee Janet Ankit.  She expressed significant anger at pt, noting that he promised 5y ago to never drink again, and had been drinking heavily for the past 4 months.  She does not want him to come home.  She wants him to instead go directly to inpatient CD Tx somewhere for at least 2 months.  She notes that pt's brother and pt's daughter both also want pt to attend inpatient CD Tx.  She has not told the pt yet, but she is going to move out.  She was hoping to be gone before he came home.  Janet implored this writer to keep patient until at least Saturday before d/c'ing him.  She became tearful, noted that she is starting a new job today and \"I just can't deal with his crap anymore.\"  She reports that he may be overstating his financial stress, and besides he made things much worse by becoming hospitalized and no longer earning an income.  This writer noted that pt would soon no longer meet hospitalization criteria and would need to discharge soon.  Pt had expressed interest in discharging to go complete R25 eval at [facility].    Later this afternoon pt had his initial meeting for Rule 25 assessment.  SW indicated that placement location depends upon determination of whether pt's secondary insurance policy will cover Tx.  [facility] outpatient program [facility] will contact pt on the unit to do a phone screen and verify insurance.  If [facility] ins policy doesn't work, pt will pursue outpatient Tx at [facility] as they accept " "Medicare.    Early this evening this writer again spoke with pt's génesis, stated that pt expects to conduct phone interview on the unit tomorrow with Kishan turner.  She expresses relief that she can finish moving out tonight.  She and the pt have some shared bills which she will disentangle.  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  She is currently not concerned for her safety; she notes that the pt doesn't have a violent Hx, and she would place a restraining order if he became problematic.  She's more worried for the pt's own safety; he stumbled and wavered at the top of a tall staircase at home when drunk, and could easily have fallen.  She has a zero-tolerance alcohol policy because she grew up in an alcoholic household; pt was appealing to her because he said he'd recovered from alcoholism, and she took him at his word that he'd never drink again.  Her adult children said they won't bring her grandchildren to visit anymore while she's still living at that house.  \"I have to be selfish and think of myself, and the lives of my children and grandchildren.\"    Review of systems:     The Review of Systems is negative other than noted in the HPI         Medications:     Current Facility-Administered Medications   Medication     acetaminophen (TYLENOL) tablet 500-1,000 mg     benzonatate (TESSALON) capsule 100 mg     albuterol (PROAIR HFA/PROVENTIL HFA/VENTOLIN HFA) Inhaler 1-2 puff     ascorbic acid (VITAMIN C) tablet 1,000 mg     vitamin B complex with vitamin C (STRESS TAB) tablet 1 tablet     cholecalciferol (vitamin D3) tablet 400 Units     fluticasone (FLONASE) 50 MCG/ACT spray 2 spray     glucosamine-chondroitin 500-400 MG per capsule 1 capsule     pantoprazole (PROTONIX) EC tablet 40 mg     mirtazapine (REMERON) tablet 30 mg     sertraline (ZOLOFT) tablet 150 mg     tamsulosin (FLOMAX) capsule 0.4 mg     " "traZODone (DESYREL) tablet 300 mg     vitamin E (TOCOPHEROL) capsule 1,000 Units     hydrOXYzine (ATARAX) tablet 25-50 mg     OLANZapine (zyPREXA) tablet 10 mg    Or     OLANZapine (zyPREXA) injection 10 mg     ibuprofen (ADVIL/MOTRIN) tablet 400 mg     fish oil-omega-3 fatty acids capsule 1 g     naproxen (NAPROSYN) tablet 250 mg     clonazePAM (klonoPIN) tablet 2 mg             Allergies:   No Known Allergies         Psychiatric Examination:   /83  Pulse 95  Temp 98.3  F (36.8  C) (Oral)  Resp 17  Ht 1.753 m (5' 9\")  Wt 85.4 kg (188 lb 4.8 oz)  SpO2 99%  BMI 27.81 kg/m2  Weight is 188 lbs 4.8 oz  Body mass index is 27.81 kg/(m^2).    Appearance:  awake, alert, adequately groomed, dressed in hospital scrubs, appeared younger than stated age, cooperative, no apparent distress and normal weight  Attitude:  cooperative  Eye Contact:  good  Mood:  better and good  Affect:  appropriate and in normal range, mood congruent, intensity is normal, full range and reactive  Speech:  clear, coherent and normal prosody  Psychomotor Behavior:  no evidence of tardive dyskinesia, dystonia, or tics and intact station, gait and muscle tone  Thought Process:  logical, linear, goal oriented, circumstantial and repetitive storytelling  Associations:  no loose associations  Thought Content:  no evidence of suicidal ideation or homicidal ideation, no evidence of psychotic thought, no auditory hallucinations present, no visual hallucinations present and some grandiosity  Insight:  fair  Judgment:  fair  Oriented to:  time, person, and place  Attention Span and Concentration:  intact  Recent and Remote Memory:  intact  Language: Fluent in conversational English   Fund of Knowledge: appropriate  Muscle Strength and Tone: normal  Gait and Station: Normal         Labs:     - uTox: (+) for benzos  (pt has active Rxs for these)  - CBC: wnl  - CMP: wnl except AST 51 (H)  - EtOH: wnl ( < 0.01 )    "

## 2018-01-04 NOTE — PROGRESS NOTES
Rule 25 Assessment  Background Information   1. Date of Assessment Request  2. Date of Assessment  1/4/2018  3. Date Service Authorized     4.   [facility]    5.  Phone Number   [phone]  6. Referent  Self 7. Assessment Site  [id number]     8. Client Name   Edwige Ochoa 9. Date of Birth  1950 Age  67 year old 10. Gender  male  11. PMI/ Insurance No.  [id number]   12. Client's Primary Language:  English 13. Do you require special accommodations, such as an  or assistance with written material? No   14. Current Address: [address] [place] [address]   15. Client Phone Numbers: [phone] (home)      16. Tell me what has happened to bring you here today.    Per ED note:    Edwige Ochoa is a 67 year old male, with a history of depression and anxiety, who presents with his fiance to the ED for evaluation of depression. The patient's fiance reports the patient began drinking again 3 months ago due to stress from working again because of financial issues. The fiance notes the patient has drank 3 bottles of Vodka in the past 6 days. The fiance reports the patient has made excuses to not go to work the past 2 weeks and has been laying in bed for the past week; he is only getting up to use the bathroom or secretly drink. The fiance notes the patient has fallen multiple times due to intoxication and has bruises all over; there is no head trauma suspected. The fiance reports the patient may be taking extra doses of his medication but patient denies this, he has no SI or self harm thoughts. The patient reports his depression, stress, and alcoholism is draining him of energy. The patient notes his medications are not alleviating his symptoms or enabling him to sleep. The patient reports he has been seeing a psychiatrist monthly; however, he has not admitted to drinking due to being ashamed. The patient notes he would like to be admitted to receive help. The  patient denies any suicidal ideation, homicidal ideation, hallucinations, tobacco use, substance use, or other physical symptoms.  Of note he does take ativan as needed for sleep.    17. Have you had other rule 25 assessments?     Yes. When, Where, and What circumstances: March in 2012 for alcohol abuse.    DIMENSION I - Acute Intoxication /Withdrawal Potential   1. Chemical use most recent 12 months outside a facility and other significant use history (client self-report)              X = Primary Drug Used   Age of First Use Most Recent Pattern of Use and Duration   Need enough information to show pattern (both frequency and amounts) and to show tolerance for each chemical that has a diagnosis   Date of last use and time, if needed   Withdrawal Potential? Requiring special care Method of use  (oral, smoked, snort, IV, etc)      Alcohol     50 Age 50: Nightcap of vodka daily  Age 52-62: 1 liter/day  Age 62: Was sober until age 67  Current: Past 3 months 4 oz/day progressing to 1 liter/day 12/28/17 Yes Oral      Marijuana/  Hashish   N/A           Cocaine/Crack     N/A           Meth/  Amphetamines   N/A           Heroin     N/A           Other Opiates/  Synthetics   N/A           Inhalants     N/A           Benzodiazepines     N/A           Hallucinogens     N/A           Barbiturates/  Sedatives/  Hypnotics N/A           Over-the-Counter Drugs   N/A           Other     N/A           Nicotine     N/A          2. Do you use greater amounts of alcohol/other drugs to feel intoxicated or achieve the desired effect?  Yes.  Or use the same amount and get less of an effect?  Yes.  Example: Pt endorses symptoms of tolerance followed by periods of withdrawal.    3A. Have you ever been to detox?     Yes    3B. When was the first time?     2012    3C. How many times since then?     0    3D. Date of most recent detox:     March 2012    4.  Withdrawal symptoms: Have you had any of the following withdrawal symptoms?  Past 12  months Recent (past 30 days)   Unable to Sleep  Agitation  Fatigue / Extremely Tired  Diarrhea  Diminished Appetite Sweating (Rapid Pulse)  Shaky / Jittery / Tremors  Unable to Sleep  Agitation  Fatigue / Extremely Tired  Sad / Depressed Feeling  Vivid / Unpleasant Dreams  Irritability  Diarrhea  Diminished Appetite  Unable to Eat  Anxiety / Worried     's Visual Observations and Symptoms: No visible withdrawal symptoms at this time    Based on the above information, is withdrawal likely to require attention as part of treatment participation?  No    Dimension I Ratings   Acute intoxication/Withdrawal potential - The placing authority must use the criteria in Dimension I to determine a client s acute intoxication and withdrawal potential.    RISK DESCRIPTIONS - Severity ratin Client displays full functioning with good ability to tolerate and cope with withdrawal discomfort. No signs or symptoms of intoxication or withdrawal or resolving signs or symptoms.    REASONS SEVERITY WAS ASSIGNED (What about the amount of the person s use and date of most recent use and history of withdrawal problems suggests the potential of withdrawal symptoms requiring professional assistance? )     Pt reports drug of choice is alcohol and last date of use was 17. Pt reports he admitted to the hospital on 18 and had been detoxing on his own. Withdrawal symptoms were monitored by [facility] medical staff. Symptoms should not interfere with pt ability to participate in treatment.          DIMENSION II - Biomedical Complications and Conditions   1. Do you have any current health/medical conditions?(Include any infectious diseases, allergies, or chronic or acute pain, history of chronic conditions)       Yes.   Illnesses/Medical Conditions you are receiving care for: Per EPIC records:  Chronic allergic rhinitis  Obesity  HTN  Left rotator cuff complete tear   Bilateral rotator cuff tendonitis    Bilateral knee osteoarthritis   Insomnia  BPH w/ LUTS  GERD  Substance abuse  Cervicalgia  Allergic rhinitis  Anxiety  Arthritis  Depression  Blood transfusion   IBS  Migraines   Asthma     2. Do you have a health care provider? When was your most recent appointment? What concerns were identified?     Pt reports PCP is Dr. Frandy Page. Reports last appointment was 2 weeks prior to discuss symptoms of sinus infection, bronchitis.    3. If indicated by answers to items 1 or 2: How do you deal with these concerns? Is that working for you? If you are not receiving care for this problem, why not?      Pt was put on antibiotics. Reports that antibiotics did not help. Was put on a second dose of antibiotics which cured his virus.    4A. List current medication(s) including over-the-counter or herbal supplements--including pain management:     Prior to Admission medications    Medication Sig Start Date End Date Taking? Authorizing Provider   glucosamine-chondroitin 500-400 MG CAPS per capsule Take 1 capsule by mouth 2 times daily 1/4/18 2/3/18 Yes Chauncey Herbert MD   ibuprofen (ADVIL/MOTRIN) 400 MG tablet Take 1 tablet (400 mg) by mouth every 6 hours as needed for moderate pain 1/4/18  Yes Chauncey Herbert MD   naproxen (NAPROSYN) 250 MG tablet Take 1 tablet (250 mg) by mouth 2 times daily as needed for moderate pain 1/4/18 2/3/18 Yes Chauncey Herbert MD   hydrOXYzine (ATARAX) 25 MG tablet Take 1-2 tablets (25-50 mg) by mouth every 4 hours as needed for anxiety 1/4/18  Yes Chauncey Herbert MD   albuterol (VENTOLIN HFA) 108 (90 BASE) MCG/ACT Inhaler Inhale 1-2 puffs into the lungs every 6 hours as needed for wheezing 1/4/18  Yes Chauncey Herbert MD   clonazePAM (KLONOPIN) 2 MG tablet Take 1 tablet (2 mg) by mouth At Bedtime 1/4/18 2/3/18 Yes Chauncey Herbert MD   mirtazapine (REMERON) 30 MG tablet Take 1 tablet (30 mg) by mouth At Bedtime 1/4/18 2/3/18 Yes Chauncey Herbert MD   sertraline (ZOLOFT) 50 MG  tablet Take 3 tablets (150 mg) by mouth daily 1/4/18 2/3/18 Yes Chauncey Herbert MD   traZODone 300 MG TABS Take 300 mg by mouth At Bedtime 1/4/18 2/3/18 Yes Chauncey Herbert MD   fluticasone (FLONASE) 50 MCG/ACT spray Spray 2 sprays into both nostrils daily 1/4/18  Yes Chauncey Herbert MD   tamsulosin (FLOMAX) 0.4 MG capsule Take 1 capsule (0.4 mg) by mouth daily 1/4/18 2/3/18 Yes Chauncey Herbert MD   vitamin B complex with vitamin C (STRESS TAB) TABS tablet Take 1 tablet by mouth daily 1/4/18 2/3/18 Yes Chauncey Herbert MD   LANsoprazole (PREVACID) 30 MG CR capsule Take 4 capsules (120 mg) by mouth daily Take 30-60 minutes before a meal. 1/4/18 2/3/18 Yes Chauncey Herbert MD   vitamin E (TOCOPHEROL) 1000 UNIT capsule Take 1 capsule (1,000 Units) by mouth daily 1/4/18 2/3/18 Yes Chauncey Herbert MD   fish oil-omega-3 fatty acids 1000 MG capsule Take 1 capsule (1 g) by mouth daily 1/4/18 2/3/18 Yes Chauncey Herbert MD   glucosamine-chondroitin 500-400 MG CAPS per capsule Take 2 capsules by mouth 2 times daily   Yes Unknown, Entered By History   sennosides (SENOKOT) 8.6 MG tablet Take 2 tablets by mouth daily   Yes Unknown, Entered By History   Echinacea CAPS Take 1,520 mg by mouth 2 times daily   Yes Reported, Patient   Omega 3-6-9 Fatty Acids CAPS Take 1 capsule by mouth 2 times daily   Yes Reported, Patient   sildenafil (VIAGRA) 100 MG tablet Take 0.5-1 tablets ( mg) by mouth daily as needed for erectile dysfunction Take 30 min to 4 hours before intercourse.  Never use with nitroglycerin, terazosin or doxazosin. 11/3/16   Frandy Page MD     Current Facility-Administered Medications   Medication     acetaminophen (TYLENOL) tablet 500-1,000 mg     benzonatate (TESSALON) capsule 100 mg     albuterol (PROAIR HFA/PROVENTIL HFA/VENTOLIN HFA) Inhaler 1-2 puff     ascorbic acid (VITAMIN C) tablet 1,000 mg     vitamin B complex with vitamin C (STRESS TAB) tablet 1 tablet     cholecalciferol  (vitamin D3) tablet 400 Units     fluticasone (FLONASE) 50 MCG/ACT spray 2 spray     glucosamine-chondroitin 500-400 MG per capsule 1 capsule     pantoprazole (PROTONIX) EC tablet 40 mg     mirtazapine (REMERON) tablet 30 mg     sertraline (ZOLOFT) tablet 150 mg     tamsulosin (FLOMAX) capsule 0.4 mg     traZODone (DESYREL) tablet 300 mg     vitamin E (TOCOPHEROL) capsule 1,000 Units     hydrOXYzine (ATARAX) tablet 25-50 mg     OLANZapine (zyPREXA) tablet 10 mg    Or     OLANZapine (zyPREXA) injection 10 mg     ibuprofen (ADVIL/MOTRIN) tablet 400 mg     fish oil-omega-3 fatty acids capsule 1 g     naproxen (NAPROSYN) tablet 250 mg     clonazePAM (klonoPIN) tablet 2 mg       4B. Do you follow current medical recommendations/take medications as prescribed?     Yes    4C. When did you last take your medication?     2018 at [facility]    5. Has a health care provider/healer ever recommended that you reduce or quit alcohol/drug use?     Yes    6. Are you pregnant?     N/A    7. Have you had any injuries, assaults/violence towards you, accidents, health related issues, overdose(s) or hospitalizations related to your use of alcohol or other drugs:     No    8. Do you have any specific physical needs/accommodations? No    Dimension II Ratings   Biomedical Conditions and Complications - The placing authority must use the criteria in Dimension II to determine a client s biomedical conditions and complications.   RISK DESCRIPTIONS - Severity ratin Client tolerates and srinivas with physical discomfort and is able to get the services that the client needs.    REASONS SEVERITY WAS ASSIGNED (What physical/medical problems does this person have that would inhibit his or her ability to participate in treatment? What issues does he or she have that require assistance to address?)    Pt reports that he was recently ill with a virus but couldn't remember what. Was on antibiotics for this and it cleared. Pt reports history  "of chronic pain, GERD, asthma, insomnia, migraines. Reports he is prescribed medications for the above conditions and takes as recommended by physician.         DIMENSION III - Emotional, Behavioral, Cognitive Conditions and Complications   1. (Optional) Tell me what it was like growing up in your family. (substance use, mental health, discipline, abuse, support)     Pt reports he was raised by both parents. Pt reports he is the oldest child of 4 kids. Pt reports that he was born and raised in [place], parents were missionaries. Pt states that he felt like he was \"[place] on the [place].\" Pt reports that his parents were very positive influences in his life. Denies history of childhood abuse or neglect. Reports home was safe and comforting. Pt moved to [facility] at age 8 due to mother's mental health \"major mental breakdown that required us to come back to the states\". Pt reports mother was institutionalized here at [facility]. Denies any other family history of mental health. Denies substance abuse history. Pt reports he felt supported 100% of the time.    2. When was the last time that you had significant problems...  A. with feeling very trapped, lonely, sad, blue, depressed or hopeless  about the future? 2 - 12 months ago    B. with sleep trouble, such as bad dreams, sleeping restlessly, or falling  asleep during the day? 2 - 12 months ago    C. with feeling very anxious, nervous, tense, scared, panicked, or like  something bad was going to happen? 1+ years ago    D. with becoming very distressed and upset when something reminded  you of the past? Never, \"the past is my friend. I learn from my past.\"    E. with thinking about ending your life or committing suicide? Never    3. When was the last time that you did the following things two or more times?  A. Lied or conned to get things you wanted or to avoid having to do  something? 2 - 12 months ago    B. Had a hard time paying attention at school, work, or " "home? Never    C. Had a hard time listening to instructions at school, work, or home? Never    D. Were a bully or threatened other people? Never    E. Started physical fights with other people? Never    Note: These questions are from the Global Appraisal of Individual Needs--Short Screener. Any item marked  past month  or  2 to 12 months ago  will be scored with a severity rating of at least 2.     For each item that has occurred in the past month or past year ask follow up questions to determine how often the person has felt this way or has the behavior occurred? How recently? How has it affected their daily living? And, whether they were using or in withdrawal at the time?    Pt reports the above impacts daily functioning: Pt reports that when relapsing he is unable to function. Reports he sits at home in bed and drinks.     4A. If the person has answered item 2E with  in the past year  or  the past month , ask about frequency and history of suicide in the family or someone close and whether they were under the influence.     NA    Any history of suicide in your family? Or someone close to you?     No    4B. If the person answered item 2E  in the past month  ask about  intent, plan, means and access and any other follow-up information  to determine imminent risk. Document any actions taken to intervene  on any identified imminent risk.      NA    5A. Have you ever been diagnosed with a mental health problem?     Yes, If yes explain: Mild anxiety and clinical depression.    5B. Are you receiving care for any mental health issues? If yes, what is the focus of that care or treatment?  Are you satisfied with the service? Most recent appointment?  How has it been helpful?     Yes, \"I am currently hospitalized at [facility] for symptoms of mental health.\"     6. Have you been prescribed medications for emotional/psychological problems?     Yes.  6B. Current mental health medication(s) If these " "medications are listed for Dimension II, reference item II-5. See above.   6C. Are you taking your medications as instructed?  yes.    7. Does your MH provider know about your use?     Yes.  7B. What does he or she have to say about it?(DSM) \"It's fine\".    8A. Have you ever been verbally, emotionally, physically or sexually abused?      No     Follow up questions to learn current risk, continuing emotional impact.      NA    8B. Have you received counseling for abuse?      N/A    9. Have you ever experienced or been part of a group that experienced community violence, historical trauma, rape or assault?     No    10A. [place]:    No    11. Do you have problems with any of the following things in your daily life?    No    Note: If the person has any of the above problems, follow up with items 12, 13, and 14. If none of the issues in item 11 are a problem for the person, skip to item 15.    12. Have you been diagnosed with traumatic brain injury or Alzheimer s?  No    13. If the answer to #12 is no, ask the following questions:    Have you ever hit your head or been hit on the head? No    Were you ever seen in the Emergency Room, hospital or by a doctor because of an injury to your head? No    Have you had any significant illness that affected your brain (brain tumor, meningitis, West Nile Virus, stroke or seizure, heart attack, near drowning or near suffocation)? No    14. If the answer to #12 is yes, ask if any of the problems identified in #11 occurred since the head injury or loss of oxygen. NA    15A. Highest grade of school completed:     College graduate    15B. Do you have a learning disability? No    15C. Did you ever have tutoring in Math or English? No    15D. Have you ever been diagnosed with Fetal Alcohol Effects or Fetal Alcohol Syndrome? No    16. If yes to item 15 B, C, or D: How has this affected your use or been affected by your use?     NA    Dimension III Ratings   Emotional/Behavioral/Cognitive - " "The placing authority must use the criteria in Dimension III to determine a client s emotional, behavioral, and cognitive conditions and complications.   RISK DESCRIPTIONS - Severity ratin Client has difficulty with impulse control and lacks coping skills. Client has thoughts of suicide or harm to others without means; however, the thoughts may interfere with participation in some treatment activities. Client has difficulty functioning in significant life areas. Client has moderate symptoms of emotional, behavioral, or cognitive problems. Client is able to participate in most treatment activities.    REASONS SEVERITY WAS ASSIGNED - What current issues might with thinking, feelings or behavior pose barriers to participation in a treatment program? What coping skills or other assets does the person have to offset those issues? Are these problems that can be initially accommodated by a treatment provider? If not, what specialized skills or attributes must a provider have?    Pt reports that he was born and raised in [place], both parents were missionaries. Reports he had a good childhood. Reports mother had significant mental health and had to return to the [place] to be hospitalized. Pt denies any other family history of mental health or substance abuse. Denies history of abuse or neglect. Denies suicide ideation or self harm history. Pt has difficulty with impulse control and lacks coping skills. Pt has difficulty functioning in significant life areas.          DIMENSION IV - Readiness for Change   1. You ve told me what brought you here today. (first section) What do you think the problem really is?     \"I've been self medicating to relieve some of the stressors in my life.\"    2. Tell me how things are going. Ask enough questions to determine whether the person has use related problems or assets that can be built upon in the following areas: Family/friends/relationships; Legal; Financial; Emotional; Educational; " "Recreational/ leisure; Vocational/employment; Living arrangements (DSM)      Family: Supportive family, lives with emily who is supportive  Legal: Past DWI's  Financial: Financial stressors currently.   Educational: College degree  Emotional: mild anxiety, depression  Employment: Retired, recently picked up part time job at [employer] to have more income  Living Arrangements: Lives in [facility] with emily.    3. What activities have you engaged in when using alcohol/other drugs that could be hazardous to you or others (i.e. driving a car/motorcycle/boat, operating machinery, unsafe sex, sharing needles for drugs or tattoos, etc     None, \"I usually don't leave the house when i'm under the influence\"    4. How much time do you spend getting, using or getting over using alcohol or drugs? (DSM)     \"When I'm relapsing I'm drinking all day\"    5. Reasons for drinking/drug use (Use the space below to record answers. It may not be necessary to ask each item.)  Like the feeling Yes   Trying to forget problems No   To cope with stress Yes   To relieve physical pain No   To cope with anxiety Yes   To cope with depression No   To relax or unwind Yes   Makes it easier to talk with people No   Partner encourages use No   Most friends drink or use No   To cope with family problems No   Afraid of withdrawal symptoms/to feel better No   Other (specify)  N/A     A. What concerns other people about your alcohol or drug use/Has anyone told you that you use too much? What did they say? (DSM)     \"My family understands the destructiveness of alcoholism an dis very supportive of me being far from it.\"    B. What did you think about that/ do you think you have a problem with alcohol or drug use?     \"Yes I have an alcohol problem and am determined to be free from it\"    6. What changes are you willing to make? What substance are you willing to stop using? How are you going to do that? Have you tried that before? What " "interfered with your success with that goal?      \"I am willing to do whatever it takes.\" Pt reports that he has been in treatment in the past. Reports he had almost 6 years sober and relapsed 3 months ago due to stress.    7. What would be helpful to you in making this change?     Treatment, sober network, daily structure    Dimension IV Ratings   Readiness for Change - The placing authority must use the criteria in Dimension IV to determine a client s readiness for change.   RISK DESCRIPTIONS - Severity ratin Client is motivated with active reinforcement, to explore treatment and strategies for change, but ambivalent about illness or need for change.    REASONS SEVERITY WAS ASSIGNED - (What information did the person provide that supports your assessment of his or her readiness to change? How aware is the person of problems caused by continued use? How willing is she or he to make changes? What does the person feel would be helpful? What has the person been able to do without help?)      Patient displays verbal compliance and motivation but lacks consistent behaviors and follow-through. Pt has continued to use despite consequences. Pt appears to be in the \"contemplation\" Stage within the Stages of Change Model.            DIMENSION V - Relapse, Continued Use, and Continued Problem Potential   1. In what ways have you tried to control, cut-down or quit your use? If you have had periods of sobriety, how did you accomplish that? What was helpful? What happened to prevent you from continuing your sobriety? (DSM)     \"I had been sober for almost 6 years, I am going to alter my lifestyle so that I don't end up in the same situation again.\" Pt reports that he became stressed due to work and financial issues which triggered his relapse.    2. Have you experienced cravings? If yes, ask follow up questions to determine if the person recognizes triggers and if the person has had any success in dealing with them.     Pt " "reports cravings. States that his biggest trigger is \"being in extreme stress\"    3. Have you been treated for alcohol/other drug abuse/dependence?     Yes.  3B. Number of times(lifetime) (over what period) 2.  3C. Number of times completed treatment (lifetime) 1.  3D. During the past three years have you participated in outpatient and/or residential?  No    4. Support group participation: Have you/do you attend support group meetings to reduce/stop your alcohol/drug use? How recently? What was your experience? Are you willing to restart? If the person has not participated, is he or she willing?     Pt reports he has been in 12 step groups in the past but did not enjoy the meetings. \"I was court ordered to go 3 times a week so I just endured it.\" Reports he is interested in returning to [facility] meetings.    5. What would assist you in staying sober/straight?     \"Changing my lifestyle so I do not put myself in a highly stressful situation.\"    Dimension V Ratings   Relapse/Continued Use/Continued problem potential - The placing authority must use the criteria in Dimension V to determine a client s relapse, continued use, and continued problem potential.   RISK DESCRIPTIONS - Severity rating: 3 Client has poor recognition and understanding of relapse and recidivism issues and displays moderately high vulnerability for further substance use or mental health problems. Client has few coping skills and rarely applies coping skills.    REASONS SEVERITY WAS ASSIGNED - (What information did the person provide that indicates his or her understanding of relapse issues? What about the person s experience indicates how prone he or she is to relapse? What coping skills does the person have that decrease relapse potential?)      Patient reports having been involved in 2 past treatments (completed 1), 1 past detox admissions, and active past 12-Step support group participation. Pt reports having some sober time (6 " "years) and has tried to quit drinking in the past but relapsed. Pt lacks insight into his personal relapse process along with early warning signs and triggers. Pt lacks impulse control, sober coping skills and long-term sober maintenance skills. Pt lacks insight into the effects his use has had on his physical and mental health. Pt is at a high risk for relapse/continued use.         DIMENSION VI - Recovery Environment   1. Are you employed/attending school? Tell me about that.     Pt reports that he is retired.    2A. Describe a typical day; evening for you. Work, school, social, leisure, volunteer, spiritual practices. Include time spent obtaining, using, recovering from drugs or alcohol. (DSM)     \"I am a very project oriented person and that more than anything keeps me busy. I spend much time praying and reading the bible and other books.\"   Pt reports that while relapsing he has not been able to go to work, lays in bed and drinks alcohol.     2B. How often do you spend more time than you planned using or use more than you planned? (DSM)     \"Frequently\"    3. How important is using to your social connections? Do many of your family or friends use?     Pt reports that it is not important, family and friends do not use, \"I am not in contact with anyone that uses.\"    4A. Are you currently in a significant relationship?     Yes.  4B. How long? 3 years    4C. Sexual Orientation:     Heterosexual    5A. Who do you live with?      Pt reports that he lives with his fiance in [place]    5B. Tell me about their alcohol/drug use and mental health issues.     Pt reports that his fiance has depression.    5C. Are you concerned for your safety there? No    5D. Are you concerned about the safety of anyone else who lives with you? No    6A. Do you have children who live with you?     No    6B. Do you have children who do not live with you?     Yes.  (Ask follow up questions to learn where the children are, who has custody " "and what the person s relationship and responsibility is with these children and what hopes the person has for his or her future with these children.) Pt reports that he has 2 adult children, ages 41 and 38.    7A. Who supports you in making changes in your alcohol or drug use? What are they willing to do to support you? Who is upset or angry about you making changes in your alcohol or drug use? How big a problem is this for you?      \"My family is good support for me. For the most part my family supports me rather than is angry with me.\"    7B. This table is provided to record information about the person s relationships and available support It is not necessary to ask each item; only to get a comprehensive picture of their support system.  How often can you count on the following people when you need someone?   Partner / Spouse Usually supportive   Parent(s)/Aunt(s)/Uncle(s)/Grandparents N/A   Sibling(s)/Cousin(s) Usually supportive   Child(fransico) Always supportive   Other relative(s) Usually supportive   Friend(s)/neighbor(s) Usually supportive   Child(fransico) s father(s)/mother(s) N/A   Support group member(s) N/A   Community of natasha members Always supportive   /counselor/therapist/healer Always supportive   Other (specify) N/A     8A. What is your current living situation?     Pt reports that he lives at home with his fiance in [place], [place].    8B. What is your long term plan for where you will be living?     \"I plan to live where I presently live until something else changes.\"    8C. Tell me about your living environment/neighborhood? Ask enough follow up questions to determine safety, criminal activity, availability of alcohol and drugs, supportive or antagonistic to the person making changes.      Pt denies any concerns with living environment. Reports it is safe. Reports there is easy access to alcohol.    9. Criminal justice history: Gather current/recent history and any significant history " related to substance use--Arrests? Convictions? Circumstances? Alcohol or drug involvement? Sentences? Still on probation or parole? Expectations of the court? Current court order? Any sex offenses - lifetime? What level? (DSM)    Pt reports past history of 5 DWI's, most recent was in . Pt reports no other legal issues since then.     10. What obstacles exist to participating in treatment? (Time off work, childcare, funding, transportation, pending residential time, living situation)     None    Dimension VI Ratings   Recovery environment - The placing authority must use the criteria in Dimension VI to determine a client s recovery environment.   RISK DESCRIPTIONS - Severity ratin Client is engaged in structured, meaningful activity, but peers, family, significant other, and living environment are unsupportive, or there is criminal justice involvement by the client or among the client s peers, significant others, or in the client s living environment.    REASONS SEVERITY WAS ASSIGNED - (What support does the person have for making changes? What structure/stability does the person have in his or her daily life that will increase the likelihood that changes can be sustained? What problems exist in the person s environment that will jeopardize getting/staying clean and sober?)     Pt reports that he lives at home in [place] with his fiance. Reports he is retired but recently started working again due to financial pressures. Pt reports that his family is supportive. Pt states that while relapsing he has been unable to go to work and has been drinking daily. Pt has a history of DWI's but no legal issues since . Pt has a history of support group participation and is hopeful to return to [facility]. Pt would benefit from outpatient treatment program and rebuild sober support networks and community.          Client Choice/Exceptions   Would you like services specific to language, age, gender, culture,  Alevism preference, race, ethnicity, sexual orientation or disability?  No    What particular treatment choices and options would you like to have? Outpatient treatment    Do you have a preference for a particular treatment program? [facility], [facility], [address] or similar outpatient program.    Criteria for Diagnosis     Criteria for Diagnosis  DSM-5 Criteria for Substance Use Disorder  Instructions: Determine whether the client currently meets the criteria for Substance Use Disorder using the diagnostic criteria in the DSM-V pp.481-589. Current means during the most recent 12 months outside a facility that controls access to substances    Category of Substance Severity (ICD-10 Code / DSM 5 Code)     Alcohol Use Disorder Severe  (10.20) (303.90)   Cannabis Use Disorder NA   Hallucinogen Use Disorder NA   Inhalant Use Disorder NA   Opioid Use Disorder NA   Sedative, Hypnotic, or Anxiolytic Use Disorder NA   Stimulant Related Disorder NA   Tobacco Use Disorder NA   Other (or unknown) Substance Use Disorder NA       Collateral Contact Summary   Number of contacts made: 2    Contact with referring person:  Yes, [facility] chart review.    If court related records were reviewed, summarize here: NA    Information from collateral contacts supported/largely agreed with information from the client and associated risk ratings.      Rule 25 Assessment Summary and Plan   's Recommendation    1)  Complete an Outpatient CD Treatment Program.   2)  Abstain from all mood-altering chemicals unless prescribed by a licensed provider.   3)  Attend weekly 12-step support group meetings.     4)  Actively work with a male sponsor or  through [place] [facility] ([phone]).   5)  Follow all the recommendations of your treatment/medical providers.  6)  Remain law abiding and follow all recommendations of the Courts/PO.  7)  Patient may benefit from obtaining a full mental health evaluation.      Collateral  Contacts     Name:    Dr. Fely Borjas MD   Relationship:    [facility]   Phone Number:    [phone] Releases:           The patient is a 68 y/o male with hx of previously diagnosed major depression, generalized anxiety, and alcohol use disorder who presented to the [facility] ED describing worsening depressive symptoms in the context of significant alcohol use. The patient was medically cleared for admission to the mental health unit.     Patient reported to ED personnel that he was experiencing worsening depressive symptoms over the past several weeks, including decreased mood, energy, and concentration, and increased difficulty with sleeping. Patient noted that things have worsened even more over the past two weeks, saying that he was having trouble even getting out of bed in the morning.     Patient presented to ED with his fibonita. Fiance reported that patient has a history of significant alcohol use and that he had started drinking again about three months ago because of financial stressors and work. Fiance noted that patient hasn't been getting out of bed except to use the bathroom and drink; he has reportedly had multiple falls. She thinks he has consumed three bottles of vodka in the past six days. She is unsure about how accurately patient is taking medication. Patient does see a psychiatrist regularly but had not told psychiatrist that he was drinking again.     Upon interview, patient confirms the above history. He reports that he feels his biggest problem is anxiety. He says that his brain is always going and that this makes it difficult for him to relax, particularly when trying to fall asleep. He is on a number of medications for sleep, including trazodone, mirtazapine, clonazepam, and flurazepam. Patient feels his most recent problems began about three months ago, when he started working a new job at [employer]. Patient had been retired previously but decided to go back to work because his  "emily was going back to school and they needed some extra money. Patient reports significantly increased amounts of stress because he is never able to get a break during the day. It takes him a long time to unwind when he gets home. He attributes this partially to his anxiety and his \"unique brain\" - patient mentioned that he has an IQ of 164. In order to help deal with the heightened anxiety, patient started drinking small amounts of vodka at night to help with sleep. Patient had previously been sober for four or five years. His alcohol use increased to the point where he had 3 full bottles of vodka over the past week. His fiance found out about the alcohol use 3-4 days ago; patient has not had a drink over the past three days. He is open to pursuing CD treatment and would like help with his sleep problems. He denies thoughts of SI, HI, and SIB.      Collateral Contacts     Name:    Dr. Melissa Cardona   Relationship:    ED Physician   Phone Number:    [phone]   Releases:         Edwige Ochoa is a 67 year old male, with a history of depression and anxiety, who presents with his fiance to the ED for evaluation of depression. The patient's fiance reports the patient began drinking again 3 months ago due to stress from working again because of financial issues. The fiance notes the patient has drank 3 bottles of Vodka in the past 6 days. The fiance reports the patient has made excuses to not go to work the past 2 weeks and has been laying in bed for the past week; he is only getting up to use the bathroom or secretly drink. The fiance notes the patient has fallen multiple times due to intoxication and has bruises all over; there is no head trauma suspected. The fiance reports the patient may be taking extra doses of his medication but patient denies this, he has no SI or self harm thoughts. The patient reports his depression, stress, and alcoholism is draining him of energy. The patient notes his " medications are not alleviating his symptoms or enabling him to sleep. The patient reports he has been seeing a psychiatrist monthly; however, he has not admitted to drinking due to being ashamed. The patient notes he would like to be admitted to receive help. The patient denies any suicidal ideation, homicidal ideation, hallucinations, tobacco use, substance use, or other physical symptoms.  Of note he does take ativan as needed for sleep.    ollateral Contacts      A problematic pattern of alcohol/drug use leading to clinically significant impairment or distress, as manifested by at least two of the following, occurring within a 12-month period:    Alcohol/drug is often taken in larger amounts or over a longer period than was intended.  There is a persistent desire or unsuccessful efforts to cut down or control alcohol/drug use  Craving, or a strong desire or urge to use alcohol/drug  Recurrent alcohol/drug use resulting in a failure to fulfill major role obligations at work, school or home.  Continued alcohol use despite having persistent or recurrent social or interpersonal problems caused or exacerbated by the effects of alcohol/drug.  Important social, occupational, or recreational activities are given up or reduced because of alcohol/drug use.  Alcohol/drug use is continued despite knowledge of having a persistent or recurrent physical or psychological problem that is likely to have been caused or exacerbated by alcohol.  Tolerance, as defined by either of the following: A need for markedly increased amounts of alcohol/drug to achieve intoxication or desired effect.  Withdrawal, as manifested by either of the following: The characteristic withdrawal syndrome for alcohol/drug (refer to Criteria A and B of the criteria set for alcohol/drug withdrawal).      Specify if: In early remission:  After full criteria for alcohol/drug use disorder were previously met, none of the criteria for alcohol/drug use disorder  have been met for at least 3 months but for less than 12 months (with the exception that Criterion A4,  Craving or a strong desire or urge to use alcohol/drug  may be met).     In sustained remission:   After full criteria for alcohol use disorder were previously met, non of the criteria for alcohol/drug use disorder have been met at any time during a period of 12 months or longer (with the exception that Criterion A4,  Craving or strong desire or urge to use alcohol/drug  may be met).   Specify if:   This additional specifier is used if the individual is in an environment where access to alcohol is restricted.    Mild: Presence of 2-3 symptoms    Moderate: Presence of 4-5 symptoms    Severe: Presence of 6 or more symptoms

## 2018-01-04 NOTE — PROGRESS NOTES
Writer completed Rule 25 assessment. Pt has Medicare for insurance and also states that he has active BCBS insurance that just started on 1/1/18 that he pays for. Initially writer thought that the BCBS policy was no longer active so writer obtained [facility] for [facility] and made referral to that outpatient program. After pt explained insurance  then made a referral to [facility] outpatient program in [place]. Spoke with intake at [facility] who requested we fax the assessment. They will contact pt on the unit to do phone screen and verify that secondary insurance policy will work. If it does not, pt should continue with outpatient referral at [facility] as they accept Medicare.

## 2018-01-04 NOTE — PROGRESS NOTES
01/04/18 1407   Behavioral Health   Hallucinations denies / not responding to hallucinations   Thinking intact   Orientation time: oriented;date: oriented;place: oriented   Insight admits / accepts   Judgement intact   Eye Contact at examiner   Affect other (see comments)  (Neutral)   Mood mood is calm   Physical Appearance/Attire attire appropriate to age and situation   Hygiene well groomed   Suicidality other (see comments)  (Denies it)   1. Wish to be Dead No   2. Non-Specific Active Suicidal Thoughts  No   Self Injury other (see comment)  (non observed)   Elopement (None observed)   Activity other (see comment)  (Participated on unit activities)   Speech clear;coherent   Psychomotor / Gait steady     Pt was up and visible after breakfast. When writer checked pt's vitals, pt stated he thinks he has the flu. Vitals were within normal ranges. Pt was observed attending different groups, watched tv, and read newspaper. Social with his peers. Calm and cooperative behavior. Denies thoughts to hurt self or others. Contracts for safety.

## 2018-01-04 NOTE — PROGRESS NOTES
"   01/03/18 2248   Behavioral Health   Hallucinations denies / not responding to hallucinations   Thinking intact   Orientation person: oriented;place: oriented;date: oriented;time: oriented   Memory baseline memory   Insight admits / accepts   Judgement intact   Eye Contact at examiner   Affect full range affect   Mood mood is calm   Physical Appearance/Attire attire appropriate to age and situation   Hygiene well groomed   Suicidality other (see comments)  (denies)   Wish to be Dead Description (no)   Non-Specific Active Suicidal Thought Description (no)   Self Injury other (see comment)  (denies)   Elopement (none observed)   Activity other (see comment)  (active in the milieu)   Speech clear;coherent   Medication Sensitivity no stated side effects;no observed side effects   Psychomotor / Gait balanced   Psycho Education   Type of Intervention 1:1 intervention   Response participates, initiates socially appropriate   Hours 0.5   Treatment Detail (check-in)   Activities of Daily Living   Hygiene/Grooming independent   Oral Hygiene independent   Dress scrubs (behavioral health)   Room Organization independent   Activity   Activity Assistance Provided independent       Pt was visible in the milieu, social with peers and pacing the halls earlier in the shift.  Feels hopeless because he has some issues at home and cant do anything about it since he is in the hospital.  Stated that, \"she feels agitated.\"   "

## 2018-01-04 NOTE — PROGRESS NOTES
Writer met with pt to provide paperwork for Rule 25 evaluation. Pt was in bed and sleeping, stated he was feeling ill and had the stomach flu. Writer left paperwork in his room, pt stated that he would work on it if he was feeling better. Writer will follow-up in the afternoon.

## 2018-01-05 VITALS
WEIGHT: 188.3 LBS | RESPIRATION RATE: 16 BRPM | OXYGEN SATURATION: 99 % | DIASTOLIC BLOOD PRESSURE: 83 MMHG | HEART RATE: 95 BPM | HEIGHT: 69 IN | TEMPERATURE: 98.5 F | BODY MASS INDEX: 27.89 KG/M2 | SYSTOLIC BLOOD PRESSURE: 150 MMHG

## 2018-01-05 PROCEDURE — A9270 NON-COVERED ITEM OR SERVICE: HCPCS | Mod: GY | Performed by: STUDENT IN AN ORGANIZED HEALTH CARE EDUCATION/TRAINING PROGRAM

## 2018-01-05 PROCEDURE — 25000132 ZZH RX MED GY IP 250 OP 250 PS 637: Mod: GY | Performed by: STUDENT IN AN ORGANIZED HEALTH CARE EDUCATION/TRAINING PROGRAM

## 2018-01-05 PROCEDURE — 99238 HOSP IP/OBS DSCHRG MGMT 30/<: CPT | Mod: GC | Performed by: PSYCHIATRY & NEUROLOGY

## 2018-01-05 PROCEDURE — 90853 GROUP PSYCHOTHERAPY: CPT

## 2018-01-05 RX ADMIN — PANTOPRAZOLE SODIUM 40 MG: 20 TABLET, DELAYED RELEASE ORAL at 09:36

## 2018-01-05 RX ADMIN — ACETAMINOPHEN 1000 MG: 500 TABLET ORAL at 09:35

## 2018-01-05 RX ADMIN — B-COMPLEX W/ C & FOLIC ACID TAB 1 TABLET: TAB at 09:35

## 2018-01-05 RX ADMIN — OXYCODONE HYDROCHLORIDE AND ACETAMINOPHEN 1000 MG: 500 TABLET ORAL at 09:36

## 2018-01-05 RX ADMIN — BENZONATATE 100 MG: 100 CAPSULE ORAL at 09:36

## 2018-01-05 RX ADMIN — TAMSULOSIN HYDROCHLORIDE 0.4 MG: 0.4 CAPSULE ORAL at 09:35

## 2018-01-05 RX ADMIN — Medication 400 UNITS: at 09:36

## 2018-01-05 RX ADMIN — FLUTICASONE PROPIONATE 2 SPRAY: 50 SPRAY, METERED NASAL at 09:34

## 2018-01-05 RX ADMIN — Medication 1 G: at 09:35

## 2018-01-05 RX ADMIN — ALBUTEROL SULFATE 2 PUFF: 90 AEROSOL, METERED RESPIRATORY (INHALATION) at 09:33

## 2018-01-05 RX ADMIN — SERTRALINE HYDROCHLORIDE 150 MG: 100 TABLET ORAL at 09:35

## 2018-01-05 RX ADMIN — Medication 1000 UNITS: at 09:36

## 2018-01-05 RX ADMIN — Medication 1 CAPSULE: at 09:36

## 2018-01-05 ASSESSMENT — ACTIVITIES OF DAILY LIVING (ADL)
ORAL_HYGIENE: INDEPENDENT
GROOMING: INDEPENDENT
DRESS: INDEPENDENT

## 2018-01-05 NOTE — PROGRESS NOTES
01/05/18 1524   Behavioral Health   Hallucinations denies / not responding to hallucinations   Thinking poor concentration   Orientation place: oriented;person: oriented;date: oriented   Memory baseline memory   Insight admits / accepts   Judgement impaired   Eye Contact at examiner   Affect blunted, flat   Mood mood is calm;depressed   Physical Appearance/Attire attire appropriate to age and situation   Hygiene well groomed   Suicidality other (see comments)  (denies)   1. Wish to be Dead No   2. Non-Specific Active Suicidal Thoughts  No   Self Injury other (see comment)  (denies)   Elopement (nothing to report)   Activity other (see comment)  (social in the milieu )   Speech clear;coherent   Medication Sensitivity no observed side effects   Psychomotor / Gait balanced;steady   Psycho Education   Type of Intervention 1:1 intervention   Response participates, initiates socially appropriate   Hours 0.5   Treatment Detail (check in)   Activities of Daily Living   Hygiene/Grooming independent   Oral Hygiene independent   Dress independent   Room Organization independent   Patient was out of his room most of this shfit and was social with peers.  The patient was attending some of the groups this shift.  The patient was reporting that he was starting to get a cold and that it was negativity affecting his mood.  The patient denies having SI and SiB.  The patient reports that due to his strong natasha he has never has suicidal thoughts and does not believe that he would ever think in that way.  The patient eat both meals this shift and his affect improved though tout the day as he appeared to be having minimal impact from the cold he thought he was getting.

## 2018-01-05 NOTE — PROGRESS NOTES
Pt attended 1 hour Mental Health Topics Group  Topic- Balanced Lifestyle  Pt completed worksheet, added suggestion for the group.

## 2018-01-05 NOTE — PROGRESS NOTES
----------------------------------------------------------------------------------------------------------  [facility], [place]   Psychiatric Progress Note    Contacts:       Janet Pham (pt's significant other), [phone]; USMAN signed    Zelda Casas (pt's Outpatient psychiatrist), [phone]; USMAN signed     Assessment      Presentation: Edwige Ochoa, a 67 year old male with history of MDD, JENNY, AUD, presented to [facility] ED with worsening anxiety and depression in the context of several weeks of progressive EtOH use and situational stressors related to finances and work.  Pt has had significant recent EtOH use and has a Hx of seizures.  Due to the severe nature of pt's depression and EtOH use leaving pt unable to function, pt warrants inpatient psychiatric hospitalization to maintain his safety.    Diagnostic Impression: Biological factors include Family Hx of severe depression, and dependence on multiple sleep medications.  Psychosocial stressors include job and financial stressors which pt has attempted to cope by self-medicating with EtOH, resulting in EtOH relapse and dependence, and uncontrolled depression leading to decreased function.  Pt also hid his drinking from his spouse who now demands that pt attend months of inpatient CD Tx.  Pt's anxiety and relative insomnia are consistent with a historic Dx of JENNY, and pt's depressive Sx are consistent with a historic Dx of Major Depressive Disorder, recurrent.  Pt's relapse and dependence on EtOH is also consistent with historic Dx of AUD.  Pt continues to express features of grandiosity and overvalued ideas of unique/superior brain / IQ / abilities, suggestive of narcissistic traits.    Hospital course: Edwige Ochoa was admitted to station 20 as a voluntary patient with Dr. Herbert as attending physician.  Pt's PTA meds were restarted except flurazepam which was held d/t co-administration of another benzo.  Pt was  placed on MSSA protocol for alcohol withdrawal.  Pt's flurazepam 30mg was restarted per PTA meds to avoid withdrawal, but was declined by pt in favor of continued clonazepam, so flurazepam was d/c'ed.  Pt had a Rule 25 evaluation and was referred to multiple facilities, scheduled interviews for placement.    Medical course: Pt endorsed respiratory infection with cough and myalgias, received Tessalon pearls and Tylenol PRN.    Plan     Principal Diagnosis: Major Depressive d/o, recurrent    Secondary psychiatric diagnoses of concern this admission:  # JENNY  # Alcohol Use Disorder: discontinued MSSA and diazepam, as pt had not scored enough to merit benzo Tx.  # Insomnia: (see continued meds below)    Medications:    New:   - olanzapine 10 mg PO/IM q2h PRN, agitation      - hydroxyzine 25-50 mg PO q4h prn, anxiety   Continue/Modify:   - clonazepam 2 mg PO QHS    (d/c'ed due to pt preference for clonazepam)   - mirtazapine 30 mg PO QHS   - trazodone 300 mg PO QHS   Hold: none  Laboratory/Imaging: none  Consults: none  Patient will be treated in therapeutic milieu with appropriate individual and group therapies as described.      Medical diagnoses to be addressed this admission:    #Hypertension  - monitor for needs; patient has previously declined meds for hypertension     #Allergic Rhinitis  - Flonase spray daily     #GERD  - Prevacid daily     #Osteoarthritis  - glucosamine BID  - naproxen PRN  - ibuprofen PRN     #BPH  - Flomax daily     #Vitamin Supplementation  - Vitamin D daily  - Vitamin C daily  - Vitamin E daily  - B complex daily  - Omega fatty acid daily    # Respiratory illness: with cough and arthralgia  - Tessalon pearls  - Tylenol 500-1000 mg PO q6h PRN, pain and fever    Relevant psychosocial stressors: financial, job stressors    Legal Status: Voluntary    Safety Assessment:   Checks: Status 15  Precautions: Suicide  Sexual  Seizure  Substance Withdrawal    Pt has not required locked seclusion or  "restraints in the past 24 hours to maintain safety, please refer to RN documentation for further details.     The risks, benefits, alternatives and side effects have been discussed and are understood by the patient and other caregivers.    Anticipated Disposition/Discharge Date: TBD;  Pending patient stabilization, medication optimization, and arrangement of appropriate outpatient follow-up and services.      This documentation accurately reflects the services I personally performed and treatment decisions made by me in consultation with the attending physician.    Frandy Esquivel MD, PGY-2 Psychiatry resident  Pager [phone]    Attestation: I, Chauncey Herbert, saw and evaluated the patient with the resident physician.  I agree with the findings and plan of care as documented in the resident note.  I have reviewed all labs and vital signs.       Interim History:   The patient's care was discussed with the treatment team and chart notes were reviewed.     VSS  Sleep: 7h  Scheduled meds: none  PRNs: albuterol, tylenol, Tessalon pearls    Per staff notes, pt was seen up and visible after breakfast.  Pt thought he had the flu, but VSS wnl.  Social, attended groups, was calm and cooperative.  No SI/SIB/HI.  Contracted for safety.    Pt was interviewed in the conference room.  Pt feels less ill today, the tessalon pearls and tylenol helped.  Pt states that his mood is good today because he is making progress towards discharge and recovery.  Pt met with the Rule 25 evaluator yesterday who made referrals to two outpatient CD Tx programs, [facility] [place], and [facility] [place].  Per note, if pt's new insurance (BC[employer]) can cover Tx as a secondary insurance, [facility] can accept him; otherwise pt can go to [facility] as they accept Medicare.  Pt states, \"the only fly in the ointment is my fiancee\", that she still firmly believes pt needs months of inpatient Tx.  This writer stated that in speaking with Janet, " the Rule 25 process was clarified: the R.25  determines pt's level of need, skills, and preferences; the  then chooses the appropriate level of service (eg. Outpatient) and refers the pt to a few possible Tx facilities.    Pt's plan for today is to follow-up with [facility], and apply to [facility].  Pt can discharge as soon as his callbacks complete.  Pt will call his fiancee for a ride home.    This writer called Janet for an update.  She states that the moving van is loaded up and she is vacating the house.  She will go pay the rent and will then be available to pick the pt up.  She was asked to share her plan with the pt within the next few hours so that he is still in the hospital if he decompensates when he receives the news.    This evening the pt's OPP (Zelda Hall) returned this writer's call, and was apprised of the situation.  She was saddened to hear that pt had relapsed, and that pt had hidden his EtOH use from her during his Oct and Nov sessions.  She notes that he had a good 6 years of sobriety for which he was appropriately proud.  She was also saddened to hear of pt's impending relationship difficulties, and hoped his fiancee tells him ASAP.  She's known the pt for years, and does not feel he is at any risk of self-harm.   She requests a copy of the DC summary upon d/c.    Review of systems:     The Review of Systems is negative other than noted in the HPI         Medications:     Current Facility-Administered Medications   Medication     acetaminophen (TYLENOL) tablet 500-1,000 mg     benzonatate (TESSALON) capsule 100 mg     albuterol (PROAIR HFA/PROVENTIL HFA/VENTOLIN HFA) Inhaler 1-2 puff     ascorbic acid (VITAMIN C) tablet 1,000 mg     vitamin B complex with vitamin C (STRESS TAB) tablet 1 tablet     cholecalciferol (vitamin D3) tablet 400 Units     fluticasone (FLONASE) 50 MCG/ACT spray 2 spray     glucosamine-chondroitin 500-400 MG per capsule 1 capsule      "pantoprazole (PROTONIX) EC tablet 40 mg     mirtazapine (REMERON) tablet 30 mg     sertraline (ZOLOFT) tablet 150 mg     tamsulosin (FLOMAX) capsule 0.4 mg     traZODone (DESYREL) tablet 300 mg     vitamin E (TOCOPHEROL) capsule 1,000 Units     hydrOXYzine (ATARAX) tablet 25-50 mg     OLANZapine (zyPREXA) tablet 10 mg    Or     OLANZapine (zyPREXA) injection 10 mg     ibuprofen (ADVIL/MOTRIN) tablet 400 mg     fish oil-omega-3 fatty acids capsule 1 g     naproxen (NAPROSYN) tablet 250 mg     clonazePAM (klonoPIN) tablet 2 mg             Allergies:   No Known Allergies         Psychiatric Examination:   /83  Pulse 95  Temp 98.5  F (36.9  C) (Oral)  Resp 16  Ht 1.753 m (5' 9\")  Wt 85.4 kg (188 lb 4.8 oz)  SpO2 99%  BMI 27.81 kg/m2  Weight is 188 lbs 4.8 oz  Body mass index is 27.81 kg/(m^2).    Appearance:  awake, alert, adequately groomed, dressed in hospital scrubs, appeared younger than stated age, cooperative, no apparent distress and normal weight  Attitude:  cooperative  Eye Contact:  good  Mood:  anxious, better and good  Affect:  appropriate and in normal range, mood congruent, intensity is normal, full range and reactive  Speech:  clear, coherent and normal prosody  Psychomotor Behavior:  no evidence of tardive dyskinesia, dystonia, or tics and intact station, gait and muscle tone  Thought Process:  logical, linear, goal oriented, circumstantial and repetitive storytelling  Associations:  no loose associations  Thought Content:  no evidence of suicidal ideation or homicidal ideation, no evidence of psychotic thought, no auditory hallucinations present, no visual hallucinations present and some grandiosity  Insight:  fair  Judgment:  fair  Oriented to:  time, person, and place  Attention Span and Concentration:  intact  Recent and Remote Memory:  intact  Language: Fluent in conversational English   Fund of Knowledge: appropriate  Muscle Strength and Tone: normal  Gait and Station: Normal         " Labs:     - uTox: (+) for benzos  (pt has active Rxs for these)  - CBC: wnl  - CMP: wnl except AST 51 (H)  - EtOH: wnl ( < 0.01 )

## 2018-01-05 NOTE — PROGRESS NOTES
01/04/18 2200   Behavioral Health   Hallucinations denies / not responding to hallucinations   Thinking intact   Orientation person: oriented;place: oriented;date: oriented;time: oriented   Memory baseline memory   Insight insight appropriate to events;insight appropriate to situation   Judgement intact   Eye Contact at examiner   Affect full range affect   Mood mood is calm   Physical Appearance/Attire attire appropriate to age and situation   Hygiene well groomed   Suicidality (denies)   1. Wish to be Dead No   2. Non-Specific Active Suicidal Thoughts  No   Self Injury (n/o, denies)   Elopement (n/o)   Activity (milieu)   Speech clear;coherent   Medication Sensitivity no observed side effects;no stated side effects   Psychomotor / Gait balanced;steady

## 2018-01-06 NOTE — PROGRESS NOTES
Pt is alert and oriented x 3. No acute distress. His mood is calm and had full range of affect. He is social with others. He denied having any SI or SIB. He has no auditory or visual hallucination. He states that his anxiety and depression are under controlled. His fiancee is here to  pt. His belongings are returned to him. His  lic., social security card and medicaid care are return to him His home medications are returned to him. Discharge instructions are reviewed with pt. His concerns are addressed. He is discharged per physician order.

## 2018-01-06 NOTE — DISCHARGE SUMMARY
----------------------------------------------------------------------------------------------------------  [facility], [place]   Discharge Summary      Edwige Ochoa MRN# [id number]   Age: 67 year old YOB: 1950     Date of Admission:  1/1/2018  Date of Discharge:  1/5/2018  Admitting Physician:  Jennifer Borjas MD  Discharge Physician:  Chauncey Herbert MD    Contacts:        Janet Pham (pt's significant other), [phone]; USMAN signed    Zelda Hall (pt's Outpatient psychiatrist), [phone]; USMAN signed          Event Leading to Hospitalization:     The patient is a 66 y/o male with hx of previously diagnosed major depression, generalized anxiety, and alcohol use disorder who presented to the [facility] ED describing worsening depressive symptoms in the context of significant alcohol use. The patient was medically cleared for admission to the mental health unit.     Patient reported to ED personnel that he was experiencing worsening depressive symptoms over the past several weeks, including decreased mood, energy, and concentration, and increased difficulty with sleeping. Patient noted that things have worsened even more over the past two weeks, saying that he was having trouble even getting out of bed in the morning.     Patient presented to ED with his fiance. Shannon reported that patient has a history of significant alcohol use and that he had started drinking again about three months ago because of financial stressors and work. Fiance noted that patient hasn't been getting out of bed except to use the bathroom and drink; he has reportedly had multiple falls. She thinks he has consumed three bottles of vodka in the past six days. She is unsure about how accurately patient is taking medication. Patient does see a psychiatrist regularly but had not told psychiatrist that he was drinking again.     Upon interview, patient confirms the above history. He  "reports that he feels his biggest problem is anxiety. He says that his brain is always going and that this makes it difficult for him to relax, particularly when trying to fall asleep. He is on a number of medications for sleep, including trazodone, mirtazapine, clonazepam, and flurazepam. Patient feels his most recent problems began about three months ago, when he started working a new job at [employer]. Patient had been retired previously but decided to go back to work because his fiance was going back to school and they needed some extra money. Patient reports significantly increased amounts of stress because he is never able to get a break during the day. It takes him a long time to unwind when he gets home. He attributes this partially to his anxiety and his \"unique brain\" - patient mentioned that he has an IQ of 164. In order to help deal with the heightened anxiety, patient started drinking small amounts of vodka at night to help with sleep. Patient had previously been sober for four or five years. His alcohol use increased to the point where he had 3 full bottles of vodka over the past week. His fiance found out about the alcohol use 3-4 days ago; patient has not had a drink over the past three days. He is open to pursuing CD treatment and would like help with his sleep problems. He denies thoughts of SI, HI, and SIB.       See Admission note by Jennifer Borjas MD on 1/1/2018 for additional details.          Diagnoses:     Principal Diagnosis: Major Depressive d/o, recurrent     Secondary psychiatric diagnoses of concern this admission:  # JENNY  # Alcohol Use Disorder  # Insomnia    Medical diagnoses to be addressed this admission:    #Hypertension  #Allergic Rhinitis  #GERD  #Osteoarthritis  #BPH  #Vitamin Supplementation  # Respiratory illness: with cough and arthralgia     Relevant psychosocial stressors: financial, job stressors          Labs:     - uTox: (+) for benzos  (pt has active Rxs for " these)  - CBC: wnl  - CMP: wnl except AST 51 (H)  - EtOH: wnl ( < 0.01 )         Consults:     No consultations were requested during this admission         Hospital Course:     Edwige Ochoa was admitted to Station 20 with attending Chauncey Herbert MD (covering attending Jennifer Borjas MD) as a voluntary patient. The patient was placed under status 15 (15 minute checks) to ensure patient safety. MSSA protocol was initiated due to the patient's history of alcohol abuse and concern for withdrawal symptoms.  CBC, BMP and utox obtained.  Patient  did not require seclusion or administration of emergency medications to manage behavior.    On admission, outpatient medications were continued except flurazepam which was held d/t co-administration of another benzo (klonopin).    Presentation of current episode: Thus far this hospitalization, pt was placed on MSSA protocol for alcohol withdrawal.  Pt's flurazepam 30mg was restarted per PTA meds to avoid withdrawal, but was declined by pt in favor of continued clonazepam, so flurazepam was d/c'ed.  Pt had a Rule 25 evaluation and was referred to multiple facilities, scheduled interviews for placement.     Medical course: Pt endorsed respiratory infection with cough and myalgias, received Tessalon pearls and Tylenol PRN.    The patient's symptoms of anxiety, depression and alcohol intoxication improved.     Edwige Ochoa was discharged to home with his fiancee Janet Pham. At the time of discharge Edwige Ochoa was determined to not be a danger to himself or others.    Today Edwige Ochoa reports improved anxiety/depression, no SI/SIB. In addition, Edwige Ochoa has notable risk factors for self-harm, including age, anxiety, substance abuse and significant situational stressors of work and finances. However, risk is mitigated by commitment to family, [employer] beliefs, absence of past attempts and ability to volunteer a safety plan.Additional steps taken  to minimize risk include: completed Rule 25 assessment, is applying to outpatient Tx facilities. Therefore, based on all available evidence including the factors cited above, Edwige Ochoa does not appear to be at imminent risk for self-harm, and is appropriate for outpatient level of care.     This document serves as a transfer of care to Edwige Ochoa's outpatient providers.         Discharge Medications:   (Pt already had supply of his discharge medications.)    Psychiatry Medications Dose/Frequency    Hydroxyzine 25-50 mg PO q4h prn, anxiety    Mirtazapine 30 mg PO QHS    Sertraline 150 mg PO daily    Trazodone 300 mg PO QHS    Clonazepam 2 mg PO QHS    Non Psychiatry Medications Dose/Frequency    Albuterol 108 mcg/ACT inhaler, 1-2 puffs q6h prn, wheezing    Fluticasone 50 mcg/ACT spray, 2 sprays into both nostrils daily    Glucosamine-chondroitin 500-400 mg caps, take 2 caps by mouth BID    Ibuprofen 400 mg PO q6h prn, moderate pain    Lansoprazole  mg PO daily, take 30-60 minutes before a meal    Naproxen 250 mg PO BID PRN, moderate pain    Tamsulosin 0.4 mg PO daily    Vitamin B complex with vitamin C tabs, take 1 tab PO daily    Vitamin E 1000 units caps, take 1 cap PO daily    Fish oil-omega-3 fatty acids caps, take 1 cap (1 g) PO daily         Psychiatric Examination:     Appearance:  awake, alert, adequately groomed, dressed in hospital scrubs, appeared younger than stated age, cooperative, no apparent distress and normal weight  Attitude:  cooperative  Eye Contact:  good  Mood:  anxious, better and good  Affect:  appropriate and in normal range, mood congruent, intensity is normal and full range  Speech:  clear, coherent and normal prosody  Psychomotor Behavior:  no evidence of tardive dyskinesia, dystonia, or tics and intact station, gait and muscle tone  Thought Process:  logical, linear, goal oriented, tangental and repetitive storytelling  Associations:  no loose associations  Thought  Content:  no evidence of suicidal ideation or homicidal ideation, no evidence of psychotic thought, no auditory hallucinations present, no visual hallucinations present and some grandiosity  Insight:  fair  Judgment:  fair  Oriented to:  time, person, and place  Attention Span and Concentration:  intact  Recent and Remote Memory:  intact  Language: Fluent in conversational English   Fund of Knowledge: appropriate  Muscle Strength and Tone: normal  Gait and Station: Normal         Discharge Plan:     Psychiatry Follow-up:   Appointment: Psychiatry: Zelda Mendoza CNS:  1/9/18 at 5pm;  2/12/18 at 8:00pm  [facility]  [address], [address]  [place] [address]   Phone [phone]    Fax [phone]      Pt completed a Rule 25 assessment 1/4/18 with Estefany Dinero LPC at [facility] on [address].  Pt is applying to several outpatient CD Tx programs, including:  [facility]; [facility]; [facility]; [facility].     [facility] Call to schedule an appt [phone]  [address] [facility] [address]      This documentation accurately reflects the services I personally performed and treatment decisions made by me in consultation with the attending physician.    Frandy Esquivel MD, PGY-2 Psychiatry resident  Pager [phone]    Attestation:  IChauncey, saw and evaluated the patient with the resident physician.  I agree with the findings and plan of care as documented in the resident note.  I have reviewed all labs and vital signs.  I, Chauncey Herbert, have reviewed this summary and agree with the findings and discharge plan as written.          NOTE: Please fax copy of DC summary to Cora Hall

## 2018-01-06 NOTE — DISCHARGE INSTRUCTIONS
Behavioral Discharge Planning and Instructions    Summary:  You were admitted to [facility] on 12/31/18 with worsening depression in the context of ongoing alcohol use/intoxication under the care of Dr. Herbert with      You were monitored closely for alcohol withdrawal without complication. You met with Dr. Herbert and his team daily for ongoing psychiatric assessment and medication management.  You had opportunities to participate in therapeutic groups on the unit.   At this time you report your mood has stabilized and you report you are not having thoughts or intent to harm yourself or others. You will be discharged home and will resume care with your outpatient providers.  You are strongly encouraged to complete a CD assessment for chemical dependence treatment placement.    Main Diagnosis:   Alcohol Dependence  Major Depressive Disorder    Major Treatments, Procedures and Findings:   Medications were  managed throughout your stay. An internal medicine consult was completed during your stay. You had the opportunity to participate in treatment programming while on the unit including occupational therapy, mental health support and education and spiritual services.     Symptoms to Report:   Please report if you are experiencing increased aggression and/or confusion, problematic loss of sleep, worsening mood, or thoughts of suicide to your treatment team or notify your primary provider.   IF THE SYMPTOMS YOU ARE EXPERIENCING ARE A MEDICAL EMERGENCY, CALL 911 IMMEDIATELY    Lifestyle Adjustment:   1. Adjust your lifestyle to get enough sleep, relaxation, exercise and good nutrition.  Continue to develop healthy coping skills to decrease stress and promote a healthy and sober lifestyle.  2. Abstain from all substances of abuse.  Attend AA meetings daily while you await treatment.  3. Take medications as prescribed.  Please work with your doctor to discuss any concerns you have with your medications or side effects  "you may be experiencing.  4. Follow up with appointments as scheduled.        Psychiatry Follow-up:   Appointment: Psychiatry: Zelda Mendoza CNS:  1/9/18 at 5pm;  2/12/18 at 8:00pm  Health Counseling Services  [facility]  [address], [address]  [place] [address]   Phone [phone]    Fax [phone]     You can go to the following sites for a Rule 25 assessment:  [facility]: [phone]  Walk-in assessments, and assessments by appointment per the following schedule:: [address] [place] [address]  Monday thru Friday, 7:00am to 2:15pm  [address], [place] [address]    Saturday s, 7:45am to 10:45am    [facility] Call to schedule an appt [phone]  [address], [facility] [address]          Resources:   *[employer] [employer] [facility]: [facility]E: ([phone]) 24 hour mobile crisis support for people having a mental health crisis in [employer].   *Acute Psychiatric Services ([phone]). 24-hour walk-in crisis psychiatric support at [facility]; Emergency Medications Clinic available 7:30am - 2:00pm  *Xvdr4tnef: Text  \"life\"  to [phone]   A trained crisis counselor will respond immediately  *[facility]risis [facility]: ([phone])  24-hour confidential telephone counseling   *[facility] Emergency Room: [phone]  *[place] [facility] ([facility]) : [facility] connects people seeking recovery to resources that help foster and sustain long-term recovery. Whether you are seeking resources for treatment, transportation, housing, job training, education, health or other pathways to recovery, [facility] is a great place to start.    [phone]      [email or web address]     General Medication Instructions:   See your medication sheet(s) for instructions.   Take all medicines as directed.  Make no changes unless your doctor suggests them.   Go to all your doctor visits.  Be sure to have all " your required lab tests. This way, your medicines can be refilled on time.  Do not use any drugs not prescribed by your doctor.  Avoid alcohol.

## 2018-01-07 DIAGNOSIS — J06.9 UPPER RESPIRATORY TRACT INFECTION, UNSPECIFIED TYPE: ICD-10-CM

## 2018-01-08 ENCOUNTER — TELEPHONE (OUTPATIENT)
Dept: FAMILY MEDICINE | Facility: CLINIC | Age: 68
End: 2018-01-08

## 2018-01-08 RX ORDER — ALBUTEROL SULFATE 90 UG/1
AEROSOL, METERED RESPIRATORY (INHALATION)
Qty: 18 G | Refills: 0 | Status: SHIPPED | OUTPATIENT
Start: 2018-01-08

## 2018-01-08 NOTE — LETTER
Dear Edwige,         Your Care Team has attempted to reach you multiple times to discuss your recent hospitalization. Please contact them at [phone].      Sincerely,    Frandy CEE

## 2018-01-08 NOTE — TELEPHONE ENCOUNTER
1 inhaler was refilled 1/4/18 by [facility] discharge provider, psych admission.    No flowsheet data found.    Patient was seen 12/21/17 by Dr. Page for URI.    Routing refill request to provider for review/approval because:  Drug not on the [facility] refill protocol for URI or rhinitis.  Fern Gallegos RN  [facility]

## 2018-01-08 NOTE — TELEPHONE ENCOUNTER
ED / Discharge Outreach Protocol    Patient Contact    Attempt # 1    Was call answered?  No.  Left message on voicemail with information to call me back.      Tracy Laurent RN  [facility]

## 2018-01-08 NOTE — TELEPHONE ENCOUNTER
"Requested Prescriptions   Pending Prescriptions Disp Refills     VENTOLIN  (90 BASE) MCG/ACT Inhaler [Pharmacy Med Name: VENTOLIN HFA INH W/DOS CTR 200PUFFS] 18 g 0    Last Written Prescription Date:  1/4/18  Last Fill Quantity: 1,  # refills: 0   Last Office Visit with [facility], [facility] or [facility] prescribing provider:  12/21/17   Future Office Visit:      Sig: INHALE 2 PUFFS INTO THE LUNGS EVERY 6 HOURS AS NEEDED FOR SHORTNESS OF BREATH OR DIFFICULT BREATHING OR WHEEZING    Asthma Maintenance Inhalers - Anticholinergics Failed    1/7/2018  8:12 PM       Failed - Asthma control test score is 20 or greater in last 6 months    Please review ACT score.          Passed - Patient is age 12 years or older       Passed - Recent (6 mo) or future visit with authorizing provider's specialty    Patient had office visit in the last 6 months or has a visit in the next 30 days with authorizing provider.  See \"Patient Info\" tab in inbasket, or \"Choose Columns\" in Meds & Orders section of the refill encounter.             "

## 2018-01-08 NOTE — TELEPHONE ENCOUNTER
This patient was discharged from [facility] on 01/05/2018.    Discharge Diagnosis: Cervicalgia,Major Depressive d/o, recurrent    Follow-up instructions:     Appointment: Psychiatry: Zelda Mendoza CNS:  1/9/18 at 5pm;  2/12/18 at 8:00pm  Health Counseling Services  [address], [address]  [place] [address]   Phone [phone]    Fax [phone]      You can go to the following sites for a Rule 25 assessment:  [facility]: [phone]  Walk-in assessments, and assessments by appointment per the following schedule:: [address] [place] [address]  Monday thru Friday, 7:00am to 2:15pm  [address], [place] [address]    Saturday s, 7:45am to 10:45am     [facility] Call to schedule an appt [phone]  [address], [facility] [address]    A follow-up visit has not been scheduled.      Please follow-up with patient.

## 2018-01-09 NOTE — TELEPHONE ENCOUNTER
ED / Discharge Outreach Protocol    Patient Contact    Attempt # 2    Was call answered?  No.  Left message on voicemail with information to call me back.      Tracy Laurent RN  [facility]

## 2018-01-10 NOTE — TELEPHONE ENCOUNTER
ED / Discharge Outreach Protocol    Patient Contact    Attempt # 3    Was call answered?  No.  Left message on voicemail with information to call me back.      Tracy Laurent RN  [facility]

## 2018-01-11 NOTE — TELEPHONE ENCOUNTER
No response after 3 phone attempts.  Flagged for TC to send letter requesting call back to clinic for message from nursing.    Fern Gallegos RN  [facility]

## 2018-01-24 ENCOUNTER — OFFICE VISIT - HEALTHEAST (OUTPATIENT)
Dept: ADDICTION MEDICINE | Facility: CLINIC | Age: 68
End: 2018-01-24

## 2018-01-24 DIAGNOSIS — Z03.89 NO DIAGNOSIS ON AXIS I: ICD-10-CM

## 2018-03-18 DIAGNOSIS — M75.41 IMPINGEMENT SYNDROME OF RIGHT SHOULDER: ICD-10-CM

## 2018-03-19 RX ORDER — NAPROXEN 500 MG/1
TABLET ORAL
Qty: 60 TABLET | Refills: 1 | Status: SHIPPED | OUTPATIENT
Start: 2018-03-19

## 2018-03-19 NOTE — TELEPHONE ENCOUNTER
Routing refill request to provider for review/approval because:  Labs out of range  Patient is over 65 years old.  Sherri Astorga RN [facility] Triage.

## 2018-03-19 NOTE — TELEPHONE ENCOUNTER
"Requested Prescriptions   Pending Prescriptions Disp Refills     naproxen (NAPROSYN) 500 MG tablet [Pharmacy Med Name: NAPROXEN 500MG TABLETS] 60 tablet 0    Last Written Prescription Date:  1/4/18  Last Fill Quantity: 60,  # refills: 0   Last office visit: 12/21/2017 with prescribing provider:     Future Office Visit:     Sig: TAKE ONE TABLET BY MOUTH TWICE DAILY AS NEEDED FOR MODERATE PAIN    NSAID Medications Failed    3/18/2018 10:11 PM       Failed - Blood pressure under 140/90 in past 12 months    BP Readings from Last 3 Encounters:   01/02/18 150/83   01/01/18 (!) 185/119   12/21/17 160/80                Failed - Patient is age 6-64 years       Passed - Normal ALT on file in past 12 months    Recent Labs   Lab Test  12/31/17 1935   ALT  42            Passed - Normal AST on file in past 12 months    Recent Labs   Lab Test  12/31/17 1935   AST  51*            Passed - Recent (12 mo) or future (30 days) visit within the authorizing provider's specialty    Patient had office visit in the last 12 months or has a visit in the next 30 days with authorizing provider or within the authorizing provider's specialty.  See \"Patient Info\" tab in inbasket, or \"Choose Columns\" in Meds & Orders section of the refill encounter.           Passed - Normal CBC on file in past 12 months    Recent Labs   Lab Test  12/31/17 1935   WBC  6.7   RBC  4.50   HGB  14.9   HCT  42.5   PLT  210            Passed - Normal serum creatinine on file in past 12 months    Recent Labs   Lab Test  12/31/17 1935   CR  0.86               "

## 2018-04-24 ENCOUNTER — TELEPHONE (OUTPATIENT)
Dept: FAMILY MEDICINE | Facility: CLINIC | Age: 68
End: 2018-04-24

## 2018-04-24 NOTE — TELEPHONE ENCOUNTER
Panel Management Review      Patient has the following on his problem list:     Depression / Dysthymia review    Measure:  Needs PHQ-9 score of 4 or less during index window.  Administer PHQ-9 and if score is 5 or more, send encounter to provider for next steps.    5 - 7 month window range:     PHQ-9 SCORE 6/2/2016 11/3/2016 12/21/2017   Total Score - - -   Total Score 4 3 8       If PHQ-9 recheck is 5 or more, route to provider for next steps.    Patient is due for:  None      Composite cancer screening  Chart review shows that this patient is due/due soon for the following Colonoscopy  Summary:    Patient is due/failing the following:   COLONOSCOPY    Action needed:   Patient needs office visit for colonoscopy.    Type of outreach:    Sent letter. 4/24/18    Questions for provider review:    None                                                                                                                                    Freda Jung [facility]        Chart routed to Care Team .

## 2018-04-24 NOTE — LETTER
April 24, 2018    SANTHOSH Ochoa  [id number] [facility] [address]    Dear SANTHOSH    We care about your health and have reviewed your health plan. We have reviewed your medical conditions, medication list, and lab results and are making recommendations based on this review, to better manage your health.    You are in particular need of attention regarding:  - Scheduling a Colon Cancer Screening (Colonoscopy only) [phone]      Here is a list of Health Maintenance topics that are due now or due soon:  Health Maintenance Due   Topic Date Due     AORTIC ANEURYSM SCREENING (SYSTEM ASSIGNED)  09/20/2015     INFLUENZA VACCINE (1) 09/01/2017     COLON CANCER SCREEN (SYSTEM ASSIGNED)  09/24/2017     FALL RISK ASSESSMENT  11/09/2017     PNEUMOCOCCAL (2 of 2 - PPSV23) 11/09/2017     We will be calling you in the next couple of weeks to help you schedule any appointments that are needed.  Please call us at [phone] (or use [employer]) to address the above recommendations.     Thank you for trusting [facility] and we appreciate the opportunity to serve you.  We look forward to supporting your healthcare needs in the future.    Healthy Regards,    Dr. Page/jarret

## 2018-04-24 NOTE — LETTER
May 8, 2018    SANTHOSH Ochoa  [id number] [place] [address]    Dear SANTHOSH    We care about your health and have reviewed your health plan. We have reviewed your medical conditions, medication list, and lab results and are making recommendations based on this review, to better manage your health.    You are in particular need of attention regarding:  - Scheduling a Colon Cancer Screening (Colonoscopy only) [phone]      Here is a list of Health Maintenance topics that are due now or due soon:  Health Maintenance Due   Topic Date Due     AORTIC ANEURYSM SCREENING (SYSTEM ASSIGNED)  09/20/2015     COLON CANCER SCREEN (SYSTEM ASSIGNED)  09/24/2017     FALL RISK ASSESSMENT  11/09/2017     PNEUMOCOCCAL (2 of 2 - PPSV23) 11/09/2017     We will be calling you in the next couple of weeks to help you schedule any appointments that are needed.  Please call us at [phone] (or use [employer]) to address the above recommendations.     Thank you for trusting [facility] and we appreciate the opportunity to serve you.  We look forward to supporting your healthcare needs in the future.    Healthy Regards,    Dr. Page/jarret

## 2018-05-01 NOTE — TELEPHONE ENCOUNTER
Called patient and left a message to return call pertaining to health maintenance item that is due.  Freda Jung CMA

## 2018-05-08 NOTE — TELEPHONE ENCOUNTER
Called patient and left a message to return call and schedule an appointment for health maintenance items that are due. Final PM letter mailed.  Freda Jung CMA

## 2018-05-11 ENCOUNTER — TELEPHONE (OUTPATIENT)
Dept: FAMILY MEDICINE | Facility: CLINIC | Age: 68
End: 2018-05-11

## 2018-05-15 DIAGNOSIS — M75.41 IMPINGEMENT SYNDROME OF RIGHT SHOULDER: ICD-10-CM

## 2018-05-15 RX ORDER — NAPROXEN 500 MG/1
TABLET ORAL
Qty: 60 TABLET | Refills: 0 | OUTPATIENT
Start: 2018-05-15

## 2018-05-15 NOTE — TELEPHONE ENCOUNTER
"Requested Prescriptions   Pending Prescriptions Disp Refills     naproxen (NAPROSYN) 500 MG tablet [Pharmacy Med Name: NAPROXEN 500MG TABLETS] 60 tablet 0    Last Written Prescription Date:  3/19/18  Last Fill Quantity: 60,  # refills: 1   Last office visit: 12/21/2017 with prescribing provider:     Future Office Visit:     Sig: TAKE 1 TABLET BY MOUTH TWICE DAILY AS NEEDED FOR MODERATE PAIN    NSAID Medications Failed    5/15/2018  2:03 PM       Failed - Blood pressure under 140/90 in past 12 months    BP Readings from Last 3 Encounters:   01/02/18 150/83   01/01/18 (!) 185/119   12/21/17 160/80                Failed - Normal AST on file in past 12 months    Recent Labs   Lab Test  12/31/17 1935   AST  51*            Failed - Patient is age 6-64 years       Passed - Normal ALT on file in past 12 months    Recent Labs   Lab Test  12/31/17 1935   ALT  42            Passed - Recent (12 mo) or future (30 days) visit within the authorizing provider's specialty    Patient had office visit in the last 12 months or has a visit in the next 30 days with authorizing provider or within the authorizing provider's specialty.  See \"Patient Info\" tab in inbasket, or \"Choose Columns\" in Meds & Orders section of the refill encounter.           Passed - Normal CBC on file in past 12 months    Recent Labs   Lab Test  12/31/17 1935   WBC  6.7   RBC  4.50   HGB  14.9   HCT  42.5   PLT  210            Passed - Normal serum creatinine on file in past 12 months    Recent Labs   Lab Test  12/31/17 1935   CR  0.86               "

## 2018-06-09 DIAGNOSIS — N40.1 BENIGN NON-NODULAR PROSTATIC HYPERPLASIA WITH LOWER URINARY TRACT SYMPTOMS: ICD-10-CM

## 2018-06-09 DIAGNOSIS — J31.0 CHRONIC RHINITIS, UNSPECIFIED TYPE: ICD-10-CM

## 2018-06-11 NOTE — TELEPHONE ENCOUNTER
"Requested Prescriptions   Pending Prescriptions Disp Refills     tamsulosin (FLOMAX) 0.4 MG capsule [Pharmacy Med Name: TAMSULOSIN 0.4MG CAPSULES] 30 capsule 0          Last Written Prescription Date:  1/4/18  Last Fill Quantity: 30,   # refills: 0  Last Office Visit: 12/21/17  Future Office visit:       Routing refill request to provider for review/approval because:  Drug not active on patient's medication list   Sig: TAKE 1 CAPSULE BY MOUTH DAILY    Alpha Blockers Failed    6/9/2018 12:17 PM       Failed - Blood pressure under 140/90 in past 12 months    BP Readings from Last 3 Encounters:   01/02/18 150/83   01/01/18 (!) 185/119   12/21/17 160/80                Failed - Patient does not have Tadalafil, Vardenafil, or Sildenafil on their medication list       Passed - Recent (12 mo) or future (30 days) visit within the authorizing provider's specialty    Patient had office visit in the last 12 months or has a visit in the next 30 days with authorizing provider or within the authorizing provider's specialty.  See \"Patient Info\" tab in inbasket, or \"Choose Columns\" in Meds & Orders section of the refill encounter.           Passed - Patient is 18 years of age or older        fluticasone (FLONASE) 50 MCG/ACT spray [Pharmacy Med Name: FLUTICASONE 50MCG NASAL SP (120) RX] 16 mL 0    Last Written Prescription Date:  1/4/18  Last Fill Quantity: 1,  # refills: 0   Last office visit: 12/21/2017 with prescribing provider:     Future Office Visit:     Sig: SHAKE LIQUID AND USE 2 SPRAYS IN EACH NOSTRIL DAILY    Inhaled Steroids Protocol Failed    6/9/2018 12:17 PM       Failed - Asthma control assessment score within normal limits in last 6 months    Please review ACT score.          Passed - Patient is age 12 or older       Passed - Recent (6 mo) or future (30 days) visit within the authorizing provider's specialty    Patient had office visit in the last 6 months or has a visit in the next 30 days with authorizing provider " "or within the authorizing provider's specialty.  See \"Patient Info\" tab in inbasket, or \"Choose Columns\" in Meds & Orders section of the refill encounter.              "

## 2018-06-12 RX ORDER — TAMSULOSIN HYDROCHLORIDE 0.4 MG/1
CAPSULE ORAL
Qty: 30 CAPSULE | Refills: 0 | OUTPATIENT
Start: 2018-06-12

## 2018-06-12 RX ORDER — FLUTICASONE PROPIONATE 50 MCG
SPRAY, SUSPENSION (ML) NASAL
Qty: 16 ML | Refills: 0 | OUTPATIENT
Start: 2018-06-12

## 2018-06-12 NOTE — TELEPHONE ENCOUNTER
"It appears tamsulosin was last ordered by a Dr. Frandy Esquivel for 30 caps with no refills on 18.   Rx is \"\" but not necessarily discontinued.  Fluticasone was also ordered by that provider on 18.  I believe this was a [facility] admission provider.    Patient was last seen by Dr. Page 17.    See note in visit:    His exam checks out well and his vital signs are fine, except for the elevated blood pressure  He has had some elevated blood pressure readings in the past as well  He is reluctant to go on medication for that for now  We discussed diet and exercise treatment for that  He will try that for a month or so and then return for a general physical and fasting lab work  He will continue his same mental health meds in the meantime as well     He was given a note to return to work     Frandy Page MD  [facility]      Patient due for annual visit.  I see 18 phone encounter states:     Patient called stating he no longer lives in [place] and no longer be needing Dr. Page's services    Should have been out of meds for quite a while now if Dr. Page is prescribing.   Assume he's got a new PCP, I see pharmacy requesting is from [place].       Attempted to call patient at home number, left message on voicemail; patient was instructed to return call to [facility] RN directly on the RN call back line at [phone] to let us know if he requested these.    Need to ask new provider for refills.  I called pharmacy, patient just got refills of both from another provider.  \"Refusal\" routed back to pharmacy with note.     Fern Gallegos, JENNIFER  [facility]        "

## 2018-08-27 ENCOUNTER — TELEPHONE (OUTPATIENT)
Dept: FAMILY MEDICINE | Facility: CLINIC | Age: 68
End: 2018-08-27

## 2018-08-27 NOTE — TELEPHONE ENCOUNTER
Panel Management Review      Patient has the following on his problem list:     Depression / Dysthymia review    Measure:  Needs PHQ-9 score of 4 or less during index window.  Administer PHQ-9 and if score is 5 or more, send encounter to provider for next steps.    5 - 7 month window range:     PHQ-9 SCORE 6/2/2016 11/3/2016 12/21/2017   Total Score - - -   Total Score 4 3 8       If PHQ-9 recheck is 5 or more, route to provider for next steps.    Patient is due for:  PHQ9    Hypertension   Last three blood pressure readings:  BP Readings from Last 3 Encounters:   01/02/18 150/83   01/01/18 (!) 185/119   12/21/17 160/80     Blood pressure: FAILED    HTN Guidelines:  Age 18-59 BP range:  Less than 140/90  Age 60-85 with Diabetes:  Less than 140/90  Age 60-85 without Diabetes:  less than 150/90      Composite cancer screening  Chart review shows that this patient is due/due soon for the following Colonoscopy  Summary:    Patient is due/failing the following:   COLONOSCOPY and PHQ9    Action needed:   5/11/18: Patient called stating he no longer lives in [place] and no longer be needing Dr. Page's services    Type of outreach:    None    Questions for provider review:    None                                                                                                                                    Freda Jung [facility]        Chart routed to Care Team .

## 2019-06-17 PROBLEM — J31.0 CHRONIC RHINITIS: Status: ACTIVE | Noted: 2017-12-22

## 2019-12-15 ENCOUNTER — HEALTH MAINTENANCE LETTER (OUTPATIENT)
Age: 69
End: 2019-12-15

## 2021-01-15 ENCOUNTER — HEALTH MAINTENANCE LETTER (OUTPATIENT)
Age: 71
End: 2021-01-15

## 2021-05-27 ENCOUNTER — RECORDS - HEALTHEAST (OUTPATIENT)
Dept: ADMINISTRATIVE | Facility: CLINIC | Age: 71
End: 2021-05-27

## 2021-06-15 NOTE — PROGRESS NOTES
"Patient arrived to his intake for day outpatient chemical dependency services.  Patient stated that his mental health is impairing him in significant life areas and was interested in dual diagnosis group.  Per Rule 25 assessment:  \"Patient was presented to the [facility] ED \"for an evaluation of depression.  Patient is a 67 year-old male \"with a history of previously diagnosed major depression, generalized anxiety, and alcohol use disorder who presented to the ED describing worsening depressive symptoms in the context of significant alcohol use.  Patient was medically cleared for admission to the mental health unit.\"    Patient was given flyer on both programs to review.  He then stated that his chronic insomnia does not permit him to attend programming that early.  Patient requested a group that had programming in the afternoon.  Per Rule 25: \"He reports his biggest problem is anxiety.  He says that his brain is always going and that this makes it difficult for him to relax, particularly when trying to fall asleep.  He is on a number of medications for sleep, including Trazadone, Mirtazapine, Clonazepam, and Flurazepam.\"    \"It takes him a long time to unwind when he gets home.  He attributes this partially to his unique brain' - patient mentioned he has an IQ of 164.  In order to help deal with the heightened anxiety, patient started drinking small amounts of Vodka at night to help with sleep.\"    Patient also requested services at [facility], since it is \"closer, easier to park at, and less chaotic in comparison to [facility].\"  Writer verified with intake that [facility] does not have immediate availability at this time for either dual diagnosis or EOP.  Unfortunately, [facility] does not provide the services patient is specifically requesting.  Writer referred patient to [facility]'s dual diagnosis, day treatment, and 55+ group.  Patient signed [employer]MAN for the facility and given a flyer, with the " number to schedule an intake.  Patient agreeable to the plan to contact [facility] and participate in Medicare-accepted programs at their facility.

## 2021-09-04 ENCOUNTER — HEALTH MAINTENANCE LETTER (OUTPATIENT)
Age: 71
End: 2021-09-04

## 2022-02-19 ENCOUNTER — HEALTH MAINTENANCE LETTER (OUTPATIENT)
Age: 72
End: 2022-02-19

## 2022-10-16 ENCOUNTER — HEALTH MAINTENANCE LETTER (OUTPATIENT)
Age: 72
End: 2022-10-16

## 2023-04-01 ENCOUNTER — HEALTH MAINTENANCE LETTER (OUTPATIENT)
Age: 73
End: 2023-04-01
